# Patient Record
Sex: FEMALE | Race: WHITE
[De-identification: names, ages, dates, MRNs, and addresses within clinical notes are randomized per-mention and may not be internally consistent; named-entity substitution may affect disease eponyms.]

---

## 2017-04-12 ENCOUNTER — RECORD ABSTRACTING (OUTPATIENT)
Age: 72
End: 2017-04-12

## 2017-04-12 DIAGNOSIS — F17.200 NICOTINE DEPENDENCE, UNSPECIFIED, UNCOMPLICATED: ICD-10-CM

## 2017-04-12 DIAGNOSIS — Z82.49 FAMILY HISTORY OF ISCHEMIC HEART DISEASE AND OTHER DISEASES OF THE CIRCULATORY SYSTEM: ICD-10-CM

## 2017-04-12 DIAGNOSIS — Z82.0 FAMILY HISTORY OF EPILEPSY AND OTHER DISEASES OF THE NERVOUS SYSTEM: ICD-10-CM

## 2017-04-12 DIAGNOSIS — Z86.018 PERSONAL HISTORY OF OTHER BENIGN NEOPLASM: ICD-10-CM

## 2017-04-12 DIAGNOSIS — Z78.9 OTHER SPECIFIED HEALTH STATUS: ICD-10-CM

## 2017-04-12 PROBLEM — Z00.00 ENCOUNTER FOR PREVENTIVE HEALTH EXAMINATION: Status: ACTIVE | Noted: 2017-04-12

## 2018-03-10 ENCOUNTER — TRANSCRIPTION ENCOUNTER (OUTPATIENT)
Age: 73
End: 2018-03-10

## 2018-05-07 ENCOUNTER — OUTPATIENT (OUTPATIENT)
Dept: OUTPATIENT SERVICES | Facility: HOSPITAL | Age: 73
LOS: 1 days | Discharge: HOME | End: 2018-05-07

## 2018-05-07 DIAGNOSIS — M54.9 DORSALGIA, UNSPECIFIED: ICD-10-CM

## 2018-05-21 ENCOUNTER — OUTPATIENT (OUTPATIENT)
Dept: OUTPATIENT SERVICES | Facility: HOSPITAL | Age: 73
LOS: 1 days | Discharge: HOME | End: 2018-05-21

## 2018-05-29 DIAGNOSIS — Z13.820 ENCOUNTER FOR SCREENING FOR OSTEOPOROSIS: ICD-10-CM

## 2018-05-29 DIAGNOSIS — F17.200 NICOTINE DEPENDENCE, UNSPECIFIED, UNCOMPLICATED: ICD-10-CM

## 2018-05-29 DIAGNOSIS — M81.0 AGE-RELATED OSTEOPOROSIS WITHOUT CURRENT PATHOLOGICAL FRACTURE: ICD-10-CM

## 2018-05-29 DIAGNOSIS — Z78.0 ASYMPTOMATIC MENOPAUSAL STATE: ICD-10-CM

## 2018-06-25 ENCOUNTER — APPOINTMENT (OUTPATIENT)
Dept: SURGERY | Facility: CLINIC | Age: 73
End: 2018-06-25
Payer: MEDICARE

## 2018-06-25 VITALS
WEIGHT: 168 LBS | HEIGHT: 60 IN | SYSTOLIC BLOOD PRESSURE: 124 MMHG | DIASTOLIC BLOOD PRESSURE: 72 MMHG | BODY MASS INDEX: 32.98 KG/M2

## 2018-06-25 PROCEDURE — 99213 OFFICE O/P EST LOW 20 MIN: CPT

## 2018-06-25 RX ORDER — NIFEDIPINE 30 MG/1
30 TABLET, FILM COATED, EXTENDED RELEASE ORAL
Qty: 14 | Refills: 0 | Status: ACTIVE | COMMUNITY
Start: 2018-03-16

## 2018-06-27 ENCOUNTER — OUTPATIENT (OUTPATIENT)
Dept: OUTPATIENT SERVICES | Facility: HOSPITAL | Age: 73
LOS: 1 days | Discharge: HOME | End: 2018-06-27

## 2018-06-27 DIAGNOSIS — Z12.31 ENCOUNTER FOR SCREENING MAMMOGRAM FOR MALIGNANT NEOPLASM OF BREAST: ICD-10-CM

## 2019-07-02 ENCOUNTER — OUTPATIENT (OUTPATIENT)
Dept: OUTPATIENT SERVICES | Facility: HOSPITAL | Age: 74
LOS: 1 days | Discharge: HOME | End: 2019-07-02
Payer: MEDICARE

## 2019-07-02 DIAGNOSIS — Z12.31 ENCOUNTER FOR SCREENING MAMMOGRAM FOR MALIGNANT NEOPLASM OF BREAST: ICD-10-CM

## 2019-07-02 PROCEDURE — 77063 BREAST TOMOSYNTHESIS BI: CPT | Mod: 26

## 2019-07-02 PROCEDURE — 77067 SCR MAMMO BI INCL CAD: CPT | Mod: 26

## 2019-08-01 ENCOUNTER — OUTPATIENT (OUTPATIENT)
Dept: OUTPATIENT SERVICES | Facility: HOSPITAL | Age: 74
LOS: 1 days | Discharge: HOME | End: 2019-08-01
Payer: MEDICARE

## 2019-08-01 DIAGNOSIS — R42 DIZZINESS AND GIDDINESS: ICD-10-CM

## 2019-08-01 DIAGNOSIS — M54.5 LOW BACK PAIN: ICD-10-CM

## 2019-08-01 PROCEDURE — 72148 MRI LUMBAR SPINE W/O DYE: CPT | Mod: 26

## 2019-08-01 PROCEDURE — 70551 MRI BRAIN STEM W/O DYE: CPT | Mod: 26

## 2020-01-30 ENCOUNTER — TRANSCRIPTION ENCOUNTER (OUTPATIENT)
Age: 75
End: 2020-01-30

## 2020-09-22 ENCOUNTER — OUTPATIENT (OUTPATIENT)
Dept: OUTPATIENT SERVICES | Facility: HOSPITAL | Age: 75
LOS: 1 days | Discharge: HOME | End: 2020-09-22
Payer: MEDICARE

## 2020-09-22 DIAGNOSIS — Z12.31 ENCOUNTER FOR SCREENING MAMMOGRAM FOR MALIGNANT NEOPLASM OF BREAST: ICD-10-CM

## 2020-09-22 PROCEDURE — 77067 SCR MAMMO BI INCL CAD: CPT | Mod: 26

## 2020-09-22 PROCEDURE — 77063 BREAST TOMOSYNTHESIS BI: CPT | Mod: 26

## 2021-02-11 ENCOUNTER — OUTPATIENT (OUTPATIENT)
Dept: OUTPATIENT SERVICES | Facility: HOSPITAL | Age: 76
LOS: 1 days | Discharge: HOME | End: 2021-02-11

## 2021-02-12 DIAGNOSIS — Z13.820 ENCOUNTER FOR SCREENING FOR OSTEOPOROSIS: ICD-10-CM

## 2021-02-12 DIAGNOSIS — Z87.310 PERSONAL HISTORY OF (HEALED) OSTEOPOROSIS FRACTURE: ICD-10-CM

## 2021-02-12 DIAGNOSIS — M81.0 AGE-RELATED OSTEOPOROSIS WITHOUT CURRENT PATHOLOGICAL FRACTURE: ICD-10-CM

## 2021-02-12 DIAGNOSIS — Z78.0 ASYMPTOMATIC MENOPAUSAL STATE: ICD-10-CM

## 2021-02-12 DIAGNOSIS — F17.200 NICOTINE DEPENDENCE, UNSPECIFIED, UNCOMPLICATED: ICD-10-CM

## 2021-09-24 ENCOUNTER — OUTPATIENT (OUTPATIENT)
Dept: OUTPATIENT SERVICES | Facility: HOSPITAL | Age: 76
LOS: 1 days | Discharge: HOME | End: 2021-09-24
Payer: MEDICARE

## 2021-09-24 DIAGNOSIS — Z12.31 ENCOUNTER FOR SCREENING MAMMOGRAM FOR MALIGNANT NEOPLASM OF BREAST: ICD-10-CM

## 2021-09-24 PROCEDURE — 77063 BREAST TOMOSYNTHESIS BI: CPT | Mod: 26

## 2021-09-24 PROCEDURE — 77067 SCR MAMMO BI INCL CAD: CPT | Mod: 26

## 2021-11-16 ENCOUNTER — LABORATORY RESULT (OUTPATIENT)
Age: 76
End: 2021-11-16

## 2021-11-16 ENCOUNTER — APPOINTMENT (OUTPATIENT)
Dept: UROGYNECOLOGY | Facility: CLINIC | Age: 76
End: 2021-11-16
Payer: MEDICARE

## 2021-11-16 VITALS
BODY MASS INDEX: 33.38 KG/M2 | SYSTOLIC BLOOD PRESSURE: 148 MMHG | HEART RATE: 81 BPM | HEIGHT: 60 IN | WEIGHT: 170 LBS | DIASTOLIC BLOOD PRESSURE: 82 MMHG

## 2021-11-16 DIAGNOSIS — Z12.4 ENCOUNTER FOR SCREENING FOR MALIGNANT NEOPLASM OF CERVIX: ICD-10-CM

## 2021-11-16 DIAGNOSIS — Z78.0 ASYMPTOMATIC MENOPAUSAL STATE: ICD-10-CM

## 2021-11-16 DIAGNOSIS — K43.9 VENTRAL HERNIA W/OUT OBSTRUCTION OR GANGRENE: ICD-10-CM

## 2021-11-16 DIAGNOSIS — Z60.2 PROBLEMS RELATED TO LIVING ALONE: ICD-10-CM

## 2021-11-16 DIAGNOSIS — Z87.39 PERSONAL HISTORY OF OTHER DISEASES OF THE MUSCULOSKELETAL SYSTEM AND CONNECTIVE TISSUE: ICD-10-CM

## 2021-11-16 DIAGNOSIS — Z86.59 PERSONAL HISTORY OF OTHER MENTAL AND BEHAVIORAL DISORDERS: ICD-10-CM

## 2021-11-16 DIAGNOSIS — Z86.39 PERSONAL HISTORY OF OTHER ENDOCRINE, NUTRITIONAL AND METABOLIC DISEASE: ICD-10-CM

## 2021-11-16 DIAGNOSIS — Z83.42 FAMILY HISTORY OF FAMILIAL HYPERCHOLESTEROLEMIA: ICD-10-CM

## 2021-11-16 DIAGNOSIS — Z86.79 PERSONAL HISTORY OF OTHER DISEASES OF THE CIRCULATORY SYSTEM: ICD-10-CM

## 2021-11-16 PROCEDURE — 51701 INSERT BLADDER CATHETER: CPT

## 2021-11-16 PROCEDURE — 99205 OFFICE O/P NEW HI 60 MIN: CPT | Mod: 25

## 2021-11-16 RX ORDER — ESTRADIOL 0.1 MG/G
0.1 CREAM VAGINAL
Qty: 1 | Refills: 6 | Status: ACTIVE | COMMUNITY
Start: 2021-11-16 | End: 1900-01-01

## 2021-11-16 RX ORDER — SERTRALINE HYDROCHLORIDE 100 MG/1
100 TABLET, FILM COATED ORAL
Refills: 0 | Status: ACTIVE | COMMUNITY

## 2021-11-16 RX ORDER — SERTRALINE HYDROCHLORIDE 25 MG/1
25 TABLET, FILM COATED ORAL
Refills: 0 | Status: ACTIVE | COMMUNITY

## 2021-11-16 RX ORDER — ASPIRIN 81 MG
81 TABLET,CHEWABLE ORAL
Refills: 0 | Status: ACTIVE | COMMUNITY

## 2021-11-16 RX ORDER — BUPROPION HYDROCHLORIDE 75 MG/1
TABLET, FILM COATED ORAL
Refills: 0 | Status: ACTIVE | COMMUNITY

## 2021-11-16 RX ORDER — ROSUVASTATIN CALCIUM 40 MG/1
40 TABLET, FILM COATED ORAL
Refills: 0 | Status: ACTIVE | COMMUNITY

## 2021-11-16 RX ORDER — HYDROCHLOROTHIAZIDE 25 MG/1
25 TABLET ORAL
Refills: 0 | Status: ACTIVE | COMMUNITY

## 2021-11-16 RX ORDER — POLYETHYLENE GLYCOL 3350 17 G/17G
17 POWDER, FOR SOLUTION ORAL
Qty: 5 | Refills: 0 | Status: DISCONTINUED | COMMUNITY
Start: 2018-03-10 | End: 2021-11-16

## 2021-11-16 RX ORDER — CYCLOBENZAPRINE HYDROCHLORIDE 5 MG/1
5 TABLET, FILM COATED ORAL
Qty: 14 | Refills: 0 | Status: COMPLETED | COMMUNITY
Start: 2018-03-10 | End: 2021-11-16

## 2021-11-16 RX ORDER — NAPROXEN 500 MG/1
500 TABLET ORAL
Qty: 20 | Refills: 0 | Status: DISCONTINUED | COMMUNITY
Start: 2018-03-19 | End: 2021-11-16

## 2021-11-16 RX ORDER — METHOCARBAMOL 750 MG/1
750 TABLET, FILM COATED ORAL
Qty: 10 | Refills: 0 | Status: DISCONTINUED | COMMUNITY
Start: 2018-04-17 | End: 2021-11-16

## 2021-11-16 SDOH — SOCIAL STABILITY - SOCIAL INSECURITY: PROBLEMS RELATED TO LIVING ALONE: Z60.2

## 2021-11-17 LAB
APPEARANCE: CLEAR
BILIRUBIN URINE: NEGATIVE
BLOOD URINE: ABNORMAL
COLOR: YELLOW
GLUCOSE QUALITATIVE U: NEGATIVE
KETONES URINE: NEGATIVE
LEUKOCYTE ESTERASE URINE: NEGATIVE
NITRITE URINE: NEGATIVE
PH URINE: 6
PROTEIN URINE: NORMAL
SPECIFIC GRAVITY URINE: 1.02
UROBILINOGEN URINE: NORMAL

## 2021-11-19 LAB — URINE CULTURE <10: NORMAL

## 2021-12-03 ENCOUNTER — LABORATORY RESULT (OUTPATIENT)
Age: 76
End: 2021-12-03

## 2021-12-03 NOTE — COUNSELING
[FreeTextEntry1] : Please return for follow up in 6-8 weeks.\par \par Place a pea size (0.5 grams or less) dab of Estrogen vaginal cream using finger (NOT the applicator) into the vagina 3 times a week ( Monday, Wednesday, Friday)\par \par For Urgency, Frequency and Urge related incontinence.\par \par Please decrease or stop the use of the following:\par \par 1. Coffee (Caffeinated and decaffeinated)\par \par 2. Teas (Caffeinated, decaffeinated, Ice tea, and green teas\par \par 3. All sodas (Caffeinated, decaffeinated, energy drinks)\par \par 4. All carbonated drinks including seltzer water\par \par 5. All citric fruit juices\par \par 6. Water with lemon or lime\par \par 7. Spicy foods\par \par 8. Tomato Sauce based foods\par \par 9. Chocolate and chocolate containing products\par \par 10. All alcohol\par \par To reduce urinary frequency at night, please restrict all fluid intake 2-3 hours prior to bedtime.\par \par For better bowel emptying please use Benefiber and start with 2 tablespoons daily.\par \par \par

## 2021-12-03 NOTE — ASU PATIENT PROFILE, ADULT - NSICDXPASTMEDICALHX_GEN_ALL_CORE_FT
PAST MEDICAL HISTORY:  Depression     High cholesterol     HTN (hypertension)     OP (osteoporosis)

## 2021-12-03 NOTE — ASU PATIENT PROFILE, ADULT - FALL HARM RISK - UNIVERSAL INTERVENTIONS
Bed in lowest position, wheels locked, appropriate side rails in place/Call bell, personal items and telephone in reach/Instruct patient to call for assistance before getting out of bed or chair/Non-slip footwear when patient is out of bed/[place] to call system/Physically safe environment - no spills, clutter or unnecessary equipment/Purposeful Proactive Rounding/Room/bathroom lighting operational, light cord in reach

## 2021-12-03 NOTE — ASSESSMENT
[FreeTextEntry1] : Overactive bladder syndrome -\par Discussed etiology of the condition with the patient. Discussed management options, including first line management options consisting of diet and lifestyle modifications, second line options consisting of medications, and third line options. Patient will start with bladder diet and bladder training. We spoke about bladder irritants at length, and she was given a list of bladder irritants to avoid. I also encouraged her quit smoking as that is very much related to her symptoms. She will return in 6-8 weeks for reevaluation.\par \par Vaginal atrophy -\par Discussed etiology and treatment options with patient. Discussed R/B/A of estrogen vaginal cream. Patient will start using as follows:\par Place a pea size (0.5 grams or less) dab of Estrogen vaginal cream using finger (NOT the applicator) into the vagina 3 times a week ( Monday, Wednesday, Friday)\par \par Nocturia -\par To reduce urinary frequency at night, please restrict all fluid intake 2-3 hours prior to bedtime.\par \par Fecal urgency -\par Discussed possible etiologies with patient. She will start using benefiber 2 tablespoons daily to regulate her bowel movements.\par

## 2021-12-03 NOTE — HISTORY OF PRESENT ILLNESS
[FreeTextEntry1] : 76 year para 4 ( x4, 3 live children) presents with complaints of urinary incontinence. She is experiencing frequency and inability to hold her urine. She's had these symptoms for at lest 6 months.\par \par Pelvic organ prolapse: no bulge, no pressure/heaviness\par \par Stress urinary incontinence: 1 x/week       no prior incontinence procedures\par \par Overactive bladder syndrome: daily frequency 7-8 x/day, 3 x/night,  + urgency,  14-21 x/week UUI episodes,    1-2 pads/day     Bladder irritants include coffee (2-3 cups), lemon water,    Prior OAB meds no\par \par Voiding dysfunction: occasional Incomplete bladder emptying, no hesitancy\par \par Lower urinary tract/vaginal symptoms: no UTIs per year\par \par 14 BM/week   no constipation   Fecal incontinence no     + fecal urgency\par \par Sexually active no     Pelvic pain no    Vaginal dryness no     LMP age 48    PMB no\par \par Current day smoker for past 50 years, currently trying to cut down.

## 2021-12-03 NOTE — PHYSICAL EXAM
[Chaperone Present] : A chaperone was present in the examining room during all aspects of the physical examination [FreeTextEntry1] : Void: 200 cc\par \par PVR: 10 cc\par \par Urethra was prepped in sterile fashion and then a sterile catheter was used by me to drain the bladder.\par \par neg empty cough stress test\par \par + atrophy\par \par no urethral caruncle\par \par no vestibular tenderness\par \par no prolapse\par \par no urethral hypermobility\par \par no pelvic floor dysfunction\par \par no urethral tenderness\par \par no bladder tenderness\par \par normal appearing cervix\par \par normal sized uterus, nontender\par \par good sphincter tone\par \par good rectal squeeze\par \par intact sacral nerves\par \par 1/5 Kegel\par

## 2021-12-06 ENCOUNTER — OUTPATIENT (OUTPATIENT)
Dept: OUTPATIENT SERVICES | Facility: HOSPITAL | Age: 76
LOS: 1 days | Discharge: HOME | End: 2021-12-06

## 2021-12-06 VITALS
OXYGEN SATURATION: 97 % | WEIGHT: 169.98 LBS | SYSTOLIC BLOOD PRESSURE: 131 MMHG | HEIGHT: 68 IN | RESPIRATION RATE: 18 BRPM | TEMPERATURE: 98 F | DIASTOLIC BLOOD PRESSURE: 89 MMHG | HEART RATE: 84 BPM

## 2021-12-06 VITALS
HEART RATE: 72 BPM | TEMPERATURE: 98 F | SYSTOLIC BLOOD PRESSURE: 157 MMHG | RESPIRATION RATE: 18 BRPM | DIASTOLIC BLOOD PRESSURE: 77 MMHG | OXYGEN SATURATION: 96 %

## 2021-12-06 DIAGNOSIS — Z90.49 ACQUIRED ABSENCE OF OTHER SPECIFIED PARTS OF DIGESTIVE TRACT: Chronic | ICD-10-CM

## 2021-12-06 RX ORDER — BUPROPION HYDROCHLORIDE 150 MG/1
0 TABLET, EXTENDED RELEASE ORAL
Qty: 0 | Refills: 0 | DISCHARGE

## 2021-12-06 RX ORDER — HYDROCHLOROTHIAZIDE 25 MG
0 TABLET ORAL
Qty: 0 | Refills: 0 | DISCHARGE

## 2021-12-06 RX ORDER — SIMVASTATIN 20 MG/1
0 TABLET, FILM COATED ORAL
Qty: 0 | Refills: 0 | DISCHARGE

## 2021-12-06 NOTE — ASU DISCHARGE PLAN (ADULT/PEDIATRIC) - CARE PROVIDER_API CALL
Ivonne Naik (MD)  Surgery of the Hand  [facility] [place] [address]  Phone: [phone]  Fax: [phone]  Follow Up Time:

## 2021-12-06 NOTE — ASU DISCHARGE PLAN (ADULT/PEDIATRIC) - NS MD DC FALL RISK RISK
For information on Fall & Injury Prevention, visit: [email or web address] OR  [email or web address] OR  https://www.cdc.gov/steadi/patient.html

## 2021-12-09 DIAGNOSIS — M81.0 AGE-RELATED OSTEOPOROSIS WITHOUT CURRENT PATHOLOGICAL FRACTURE: ICD-10-CM

## 2021-12-09 DIAGNOSIS — I10 ESSENTIAL (PRIMARY) HYPERTENSION: ICD-10-CM

## 2021-12-09 DIAGNOSIS — M65.341 TRIGGER FINGER, RIGHT RING FINGER: ICD-10-CM

## 2021-12-09 DIAGNOSIS — E78.00 PURE HYPERCHOLESTEROLEMIA, UNSPECIFIED: ICD-10-CM

## 2021-12-09 DIAGNOSIS — Z88.0 ALLERGY STATUS TO PENICILLIN: ICD-10-CM

## 2021-12-09 DIAGNOSIS — Z90.49 ACQUIRED ABSENCE OF OTHER SPECIFIED PARTS OF DIGESTIVE TRACT: ICD-10-CM

## 2021-12-09 DIAGNOSIS — F32.A DEPRESSION, UNSPECIFIED: ICD-10-CM

## 2022-01-31 ENCOUNTER — EMERGENCY (EMERGENCY)
Facility: HOSPITAL | Age: 77
LOS: 0 days | Discharge: HOME | End: 2022-01-31
Attending: EMERGENCY MEDICINE | Admitting: EMERGENCY MEDICINE
Payer: MEDICARE

## 2022-01-31 ENCOUNTER — TRANSCRIPTION ENCOUNTER (OUTPATIENT)
Age: 77
End: 2022-01-31

## 2022-01-31 VITALS
RESPIRATION RATE: 18 BRPM | DIASTOLIC BLOOD PRESSURE: 72 MMHG | HEIGHT: 68 IN | TEMPERATURE: 98 F | OXYGEN SATURATION: 97 % | HEART RATE: 84 BPM | SYSTOLIC BLOOD PRESSURE: 150 MMHG

## 2022-01-31 DIAGNOSIS — Z79.82 LONG TERM (CURRENT) USE OF ASPIRIN: ICD-10-CM

## 2022-01-31 DIAGNOSIS — F32.A DEPRESSION, UNSPECIFIED: ICD-10-CM

## 2022-01-31 DIAGNOSIS — R22.1 LOCALIZED SWELLING, MASS AND LUMP, NECK: ICD-10-CM

## 2022-01-31 DIAGNOSIS — E78.00 PURE HYPERCHOLESTEROLEMIA, UNSPECIFIED: ICD-10-CM

## 2022-01-31 DIAGNOSIS — E78.5 HYPERLIPIDEMIA, UNSPECIFIED: ICD-10-CM

## 2022-01-31 DIAGNOSIS — Z88.0 ALLERGY STATUS TO PENICILLIN: ICD-10-CM

## 2022-01-31 DIAGNOSIS — K11.20 SIALOADENITIS, UNSPECIFIED: ICD-10-CM

## 2022-01-31 DIAGNOSIS — Z90.49 ACQUIRED ABSENCE OF OTHER SPECIFIED PARTS OF DIGESTIVE TRACT: Chronic | ICD-10-CM

## 2022-01-31 DIAGNOSIS — I10 ESSENTIAL (PRIMARY) HYPERTENSION: ICD-10-CM

## 2022-01-31 PROBLEM — M81.0 AGE-RELATED OSTEOPOROSIS WITHOUT CURRENT PATHOLOGICAL FRACTURE: Chronic | Status: ACTIVE | Noted: 2021-12-06

## 2022-01-31 LAB
ALBUMIN SERPL ELPH-MCNC: 4.1 G/DL — SIGNIFICANT CHANGE UP (ref 3.5–5.2)
ALP SERPL-CCNC: 71 U/L — SIGNIFICANT CHANGE UP (ref 30–115)
ALT FLD-CCNC: 19 U/L — SIGNIFICANT CHANGE UP (ref 0–41)
ANION GAP SERPL CALC-SCNC: 17 MMOL/L — HIGH (ref 7–14)
AST SERPL-CCNC: 24 U/L — SIGNIFICANT CHANGE UP (ref 0–41)
BASOPHILS # BLD AUTO: 0.05 K/UL — SIGNIFICANT CHANGE UP (ref 0–0.2)
BASOPHILS NFR BLD AUTO: 0.7 % — SIGNIFICANT CHANGE UP (ref 0–1)
BILIRUB SERPL-MCNC: 0.3 MG/DL — SIGNIFICANT CHANGE UP (ref 0.2–1.2)
BUN SERPL-MCNC: 16 MG/DL — SIGNIFICANT CHANGE UP (ref 10–20)
CALCIUM SERPL-MCNC: 10.1 MG/DL — SIGNIFICANT CHANGE UP (ref 8.5–10.1)
CHLORIDE SERPL-SCNC: 100 MMOL/L — SIGNIFICANT CHANGE UP (ref 98–110)
CO2 SERPL-SCNC: 22 MMOL/L — SIGNIFICANT CHANGE UP (ref 17–32)
CREAT SERPL-MCNC: 0.8 MG/DL — SIGNIFICANT CHANGE UP (ref 0.7–1.5)
EOSINOPHIL # BLD AUTO: 0.14 K/UL — SIGNIFICANT CHANGE UP (ref 0–0.7)
EOSINOPHIL NFR BLD AUTO: 2.1 % — SIGNIFICANT CHANGE UP (ref 0–8)
GLUCOSE SERPL-MCNC: 126 MG/DL — HIGH (ref 70–99)
HCT VFR BLD CALC: 39.6 % — SIGNIFICANT CHANGE UP (ref 37–47)
HGB BLD-MCNC: 12.9 G/DL — SIGNIFICANT CHANGE UP (ref 12–16)
IMM GRANULOCYTES NFR BLD AUTO: 0.3 % — SIGNIFICANT CHANGE UP (ref 0.1–0.3)
LYMPHOCYTES # BLD AUTO: 1.36 K/UL — SIGNIFICANT CHANGE UP (ref 1.2–3.4)
LYMPHOCYTES # BLD AUTO: 20.3 % — LOW (ref 20.5–51.1)
MCHC RBC-ENTMCNC: 26.8 PG — LOW (ref 27–31)
MCHC RBC-ENTMCNC: 32.6 G/DL — SIGNIFICANT CHANGE UP (ref 32–37)
MCV RBC AUTO: 82.3 FL — SIGNIFICANT CHANGE UP (ref 81–99)
MONOCYTES # BLD AUTO: 0.5 K/UL — SIGNIFICANT CHANGE UP (ref 0.1–0.6)
MONOCYTES NFR BLD AUTO: 7.5 % — SIGNIFICANT CHANGE UP (ref 1.7–9.3)
NEUTROPHILS # BLD AUTO: 4.62 K/UL — SIGNIFICANT CHANGE UP (ref 1.4–6.5)
NEUTROPHILS NFR BLD AUTO: 69.1 % — SIGNIFICANT CHANGE UP (ref 42.2–75.2)
NRBC # BLD: 0 /100 WBCS — SIGNIFICANT CHANGE UP (ref 0–0)
PLATELET # BLD AUTO: 207 K/UL — SIGNIFICANT CHANGE UP (ref 130–400)
POTASSIUM SERPL-MCNC: 3.9 MMOL/L — SIGNIFICANT CHANGE UP (ref 3.5–5)
POTASSIUM SERPL-SCNC: 3.9 MMOL/L — SIGNIFICANT CHANGE UP (ref 3.5–5)
PROT SERPL-MCNC: 6.5 G/DL — SIGNIFICANT CHANGE UP (ref 6–8)
RBC # BLD: 4.81 M/UL — SIGNIFICANT CHANGE UP (ref 4.2–5.4)
RBC # FLD: 14.3 % — SIGNIFICANT CHANGE UP (ref 11.5–14.5)
SODIUM SERPL-SCNC: 139 MMOL/L — SIGNIFICANT CHANGE UP (ref 135–146)
WBC # BLD: 6.69 K/UL — SIGNIFICANT CHANGE UP (ref 4.8–10.8)
WBC # FLD AUTO: 6.69 K/UL — SIGNIFICANT CHANGE UP (ref 4.8–10.8)

## 2022-01-31 PROCEDURE — 99285 EMERGENCY DEPT VISIT HI MDM: CPT

## 2022-01-31 PROCEDURE — 70491 CT SOFT TISSUE NECK W/DYE: CPT | Mod: 26,MA

## 2022-01-31 NOTE — ED PROVIDER NOTE - PHYSICAL EXAMINATION
CONSTITUTIONAL: Well-appearing; well-nourished; in no apparent distress.   HEAD: Normocephalic; atraumatic.   EYES: PERRL; EOM intact. Conjunctiva normal B/L.   ENT: Normal pharynx with no tonsillar hypertrophy. MMM. L neck mass w/ mild ttp, no supraclavicular masses, no trismus, no swelling at the base of the tongue, no uvular deviation, no axillary masses, no trauma to the ear, b/l carotid pulses 2+,   NECK: Supple; non-tender; no cervical lymphadenopathy.   CHEST: Normal chest excursion with respiration.   CARDIOVASCULAR: Normal S1, S2; no murmurs, rubs, or gallops.   RESPIRATORY: Normal chest excursion with respiration; breath sounds clear and equal bilaterally; no wheezes, rhonchi, or rales.  GI/: Normal bowel sounds; non-distended; non-tender.  BACK: No evidence of trauma or deformity. Non-tender to palpation. No CVA tenderness.   EXT: Normal ROM in all four extremities; non-tender to palpation; distal pulses are normal. No leg edema B/L.   SKIN: Normal for age and race; warm; dry; good turgor.  NEURO: A & O x 4; CN 2-12 intact. Grossly unremarkable.

## 2022-01-31 NOTE — ED PROVIDER NOTE - NS ED ROS FT
Constitutional:  See HPI.   Eyes:  No visual changes, eye pain or discharge.  ENMT:  No hearing changes, pain, discharge or infections. No neck pain or stiffness. +L neck mass  Cardiac:  No chest pain, SOB or edema. No chest pain with exertion.  Respiratory:  No cough or respiratory distress. No hemoptysis.  GI:  No nausea, vomiting, diarrhea, abdominal pain.  :  No dysuria, frequency, hematuria  MS:  No joint pain or back pain.  Neuro:  No LOC. No headache or weakness.    Skin:  No skin rash.  Except as in HPI, all other review of systems is negative

## 2022-01-31 NOTE — ED PROVIDER NOTE - CLINICAL SUMMARY MEDICAL DECISION MAKING FREE TEXT BOX
Patient with acute onset of left neck swelling, testing included labs and CT scan and showed enlargement of the left submandibular gland without a discrete abscess.  Exam is reassuring, labs are within normal limits, the patient was prescribed antibiotics based on CT findings and advised to follow-up with ENT this week, strict return precautions given.  Stable for DC home at present.

## 2022-01-31 NOTE — ED PROVIDER NOTE - OBJECTIVE STATEMENT
76y F PMHx incontinence, HTN, HLD c/o L neck mass x1d. pt states that she noticed a L painless neck mass, went to  and was told to come to ED for further assessment. Pt's daughter states that she had an episode of "hypothyroidism" a year ago w/o further work up. pt denies other symptoms, no pain, no diaphoresis, weight change, vision changes, cp,sob, eye pain, urinary changes, no difficulty phonating, and tolerating PO

## 2022-01-31 NOTE — ED PROVIDER NOTE - PATIENT PORTAL LINK FT
You can access the FollowMyHealth Patient Portal offered by [facility] by registering at the following website: [email or web address]. By joining [facility]’s FollowMyHealth portal, you will also be able to view your health information using other applications (apps) compatible with our system.

## 2022-01-31 NOTE — ED PROVIDER NOTE - ATTENDING CONTRIBUTION TO CARE
76-year-old female with history of hyperlipidemia, hypertension, on aspirin 81, report of some thyroid or parathyroid problem which was never followed up, presents with her daughter with report of left neck swelling noticed since yesterday, appears to be increasing. Denies pain, fever, cough, weight loss, trouble swallowing or breathing, vision changes, fall/trauma, ST, and all other symptoms. On exam, afebrile, hemodynamically stable, saturating well, NAD, well appearing, sitting comfortably in bed, no WOB/stridor/phonation changes/salivary pooling, speaking full sentences, head NCAT, EOMI grossly, anicteric, MMM, no JVD, noted skin-color L neck mass, nonexpansile/nonpulsatile/no bruit, NT, RRR, nml S1/S2, no m/r/g, lungs CTAB, no w/r/r, abd soft, NT, ND, nml BS, no rebound or guarding, AAO, CN's 3-12 grossly intact, RUANO spontaneously, no leg cyanosis or edema, skin warm, well perfused, no rashes or hives. Low suspicion for arterial malformation. Low suspicion for. No B symptoms to suggest lymphoma. No tachycardia or other signs or symptoms to suggest thyroiditis. No evidence of airway compromise. Will obtain labs, CT to further clarify. Signed off care to Dr. JUAN DAVID Hall who will follow up labs and CT and reassess.

## 2022-01-31 NOTE — ED ADULT TRIAGE NOTE - CHIEF COMPLAINT QUOTE
pt states, "I noticed my neck started swelling a little." Swelling noted to left neck area.  Denies any SOB, no distress. Able to speak in full sentences. Throat visualized.

## 2022-01-31 NOTE — ED PROVIDER NOTE - CARE PLAN
Principal Discharge DX:	Swelling, mass, or lump in head and neck  Secondary Diagnosis:	Parotitis   1

## 2022-01-31 NOTE — ED PROVIDER NOTE - PROGRESS NOTE DETAILS
Signed off care to Dr. JUAN DAVID Hall who will follow up labs and CT and reassess. HPI: Patient notes to me by Dr. Arvizu to follow-up CT scan and dispo.  His CT is pending will reassess once the test is completed. TN - pt stable in stretcher, eating w/o complication, no difficulty phonating TN - sialogogue given; pt tolerated w/ saliva expression; massaged parotid gland w/ mild improvement. pt instructed to see ENT outpt f/u and to finish course of abx   Strict ED return precautions given. Pt verbalized understanding and was agreeable with plan. EP: The patient appears very well there is a mildly tender mass in the left submandibular area, normal floor of the mouth, normal phonation without drooling or trismus, there is no skin erythema, neck is supple.  Imaging CT scan was done and shows large amount of the left submandibular gland with surrounding edema and stranding without any discrete abscess.  Results of all the tests were discussed with the patient and her daughter who is at the bedside, antibiotics was prescribed, patient was advised to follow-up with ENT this week, contact information given.  Strict return precautions discussed with the patient and her daughter, they both verbalized understanding and are amenable with the plan.

## 2022-01-31 NOTE — ED PROVIDER NOTE - CARE PROVIDER_API CALL
Davidson Toribio)  Otolaryngology  [address] [place] [address], [address] [place] [address]  Phone: [phone]  Fax: [phone]  Follow Up Time: 4-6 Days

## 2022-01-31 NOTE — ED PROVIDER NOTE - NSFOLLOWUPINSTRUCTIONS_ED_ALL_ED_FT
Parotitis    Parotitis is inflammation of one or both of your parotid glands. These glands produce saliva. They are found on each side of your face, below and in front of your earlobes. The saliva that they produce comes out of tiny openings (ducts) inside your cheeks.    Parotitis may cause sudden swelling and pain (acute parotitis). It can also cause repeated episodes of swelling and pain or continued swelling that may or may not be painful (chronic parotitis).    What are the causes?    This condition may be caused by:  •Infections from bacteria.    •Infections from viruses, such as mumps or HIV.    •Blockage (obstruction) of saliva flow through the parotid glands. This can be from a stone, scar tissue, or a tumor.    •Diseases that cause your body's defense system (immune system) to attack healthy cells in your salivary glands. These are called autoimmune diseases.    What increases the risk?    You are more likely to develop this condition if:  •You are 50 years old or older.    •You do not drink enough fluids (are dehydrated).    •You drink too much alcohol.    •You have:  •A dry mouth.    •Poor dental hygiene.    •Diabetes.    •Gout.    •A long-term illness.    •You have had radiation treatments to the head and neck.    •You take certain medicines.    What are the signs or symptoms?    Symptoms of this condition depend on the cause. Symptoms may include:  •Swelling under and in front of the ear. This may get worse after eating.    •Redness of the skin over the parotid gland.    •Pain and tenderness over the parotid gland. This may get worse after eating.    •Fever or chills.    •Pus coming from the ducts inside the mouth.    •Dry mouth.    •A bad taste in the mouth.    How is this diagnosed?    This condition may be diagnosed based on:  •Your medical history.    •A physical exam.    •Tests to find the cause of the parotitis. These may include:  •Doing blood tests to check for an autoimmune disease or infections from a virus.    •Taking a fluid sample from the parotid gland and testing it for infection.    •Injecting the ducts of the parotid gland with a dye and then taking X-rays (sialogram).    •Having other imaging tests of the gland, such as X-rays, ultrasound, MRI, or CT scan.    •Checking the opening of the gland for a stone or obstruction.    •Placing a needle into the gland to remove tissue for a biopsy (fine needle aspiration).    How is this treated?    Treatment for this condition depends on the cause. Treatment may include:  •Antibiotic medicine for a bacterial infection.    •Drinking more fluids.    •Removing a stone or obstruction.    •Treating an underlying disease that is causing parotitis.    •Surgery to drain an infection, remove a growth, or remove the whole gland (parotidectomy).    Treatment may not be needed if parotid swelling goes away with home care.    Follow these instructions at home:    Medicines      •Take over-the-counter and prescription medicines only as told by your health care provider.    •If you were prescribed an antibiotic medicine, take it as told by your health care provider. Do not stop taking the antibiotic even if you start to feel better.    Managing pain and swelling   •If directed, apply heat to the affected area as often as told by your health care provider. Use the heat source that your health care provider recommends, such as a moist heat pack or a heating pad. To apply the heat:  •Place a towel between your skin and the heat source.    •Leave the heat on for 20–30 minutes.    •Remove the heat if your skin turns bright red. This is especially important if you are unable to feel pain, heat, or cold. You may have a greater risk of getting burned.    •Gargle with a salt-water mixture 3–4 times a day or as needed. To make a salt-water mixture, completely dissolve ½–1 tsp (3–6 g) of salt in 1 cup (237 mL) of warm water.    •Gently massage the parotid glands as told by your health care provider.    General instructions      •Drink enough fluid to keep your urine pale yellow.    •Keep your mouth clean and moist.    •Try sucking on sour candy. This may help to make your mouth less dry by stimulating the flow of saliva.    •Maintain good oral health.  •Brush your teeth at least two times a day.    •Floss your teeth every day.    •See your dentist regularly.    • Do not use any products that contain nicotine or tobacco, such as cigarettes, e-cigarettes, and chewing tobacco. If you need help quitting, ask your health care provider.    • Do not drink alcohol.    •Keep all follow-up visits as told by your health care provider. This is important.    Contact a health care provider if:    •You have a fever or chills.    •You have new symptoms.    •Your symptoms get worse.    •Your symptoms do not improve with treatment.    Get help right away if:    •You have difficulty breathing or swallowing because of the swollen gland.    Summary    •Parotitis is inflammation of one or both of your parotid glands.    •Symptoms include pain and swelling under and in front of the ear. They may also include a fever and a bad taste in your mouth.    •This condition may be treated with antibiotics, increasing fluids, or surgery.    •In some cases, parotitis may go away on its own without treatment.    •You should drink plenty of fluids, maintain good oral hygiene, and avoid tobacco products.    This information is not intended to replace advice given to you by your health care provider. Make sure you discuss any questions you have with your health care provider.

## 2022-02-01 NOTE — ED POST DISCHARGE NOTE - REASON FOR FOLLOW-UP
Other Pt called stating Clindamycin liquid Rx cost $200 at pharmacy. Called pharmacy and spoke with staff who confirm price secondary to quantity of liquid (states patient would require six bottles). Rx changed to Clindamycin capsules - patient notified and will .

## 2022-02-14 ENCOUNTER — APPOINTMENT (OUTPATIENT)
Dept: OTOLARYNGOLOGY | Facility: CLINIC | Age: 77
End: 2022-02-14
Payer: MEDICARE

## 2022-02-14 DIAGNOSIS — R22.1 LOCALIZED SWELLING, MASS AND LUMP, NECK: ICD-10-CM

## 2022-02-14 DIAGNOSIS — K11.20 SIALOADENITIS, UNSPECIFIED: ICD-10-CM

## 2022-02-14 PROCEDURE — 99204 OFFICE O/P NEW MOD 45 MIN: CPT

## 2022-02-14 NOTE — ASSESSMENT
[FreeTextEntry1] : Episode of left submandibular sialoadenitis related to dehydration\par Since resolved with antibiotics and sialogogues\par Discussed hydration, massage, and sialogogues if it recurs\par Imaging reviewed, no sign of sialolithiasis or compressive mass\par RV prn

## 2022-02-14 NOTE — DATA REVIEWED
[de-identified] : CT neck 1/31/2022 reviewed. Mild enlargement and enhancement left SMG gland, no mass or sialoliathiasis

## 2022-02-14 NOTE — REASON FOR VISIT
[Initial Evaluation] : an initial evaluation for [FreeTextEntry2] : submandibular swelling left side

## 2022-02-14 NOTE — HISTORY OF PRESENT ILLNESS
[de-identified] : patient presents today c/o left neck mass.  Patient noticed the lump 2 Weeks ago and was seen in urgent CARE and was  told to go to Emergency room . Ct scan performed.  She was given antibiotic and swelling has decreased significantly. Denies dysphagia or hoarseness. No previous issues, no recent dental work. She is currently asymptomatic. She admits poor hydration. She has been taking sour candies which seemed to resolve her initial problem.

## 2022-02-14 NOTE — PHYSICAL EXAM
[Nasal Endoscopy Performed] : nasal endoscopy was performed, see procedure section for findings [Midline] : trachea located in midline position [Normal] : no rashes [de-identified] : submandibular swelling left neck non tender [de-identified] : Left SMG mildly firm, no masses or lesions, floor of mouth without stone

## 2022-02-15 ENCOUNTER — APPOINTMENT (OUTPATIENT)
Dept: UROGYNECOLOGY | Facility: CLINIC | Age: 77
End: 2022-02-15
Payer: MEDICARE

## 2022-02-15 VITALS
HEART RATE: 81 BPM | BODY MASS INDEX: 33.77 KG/M2 | SYSTOLIC BLOOD PRESSURE: 154 MMHG | WEIGHT: 172 LBS | HEIGHT: 60 IN | DIASTOLIC BLOOD PRESSURE: 87 MMHG

## 2022-02-15 PROCEDURE — 99215 OFFICE O/P EST HI 40 MIN: CPT

## 2022-02-15 NOTE — ASSESSMENT
[FreeTextEntry1] : Overactive bladder syndrome -\par Patient has not seen much improvement in her symptoms with bladder diet and vaginal estrogen cream. Today we discussed treatment with second line options for her. Specifically we discussed R/B/A of anticholinergics. Patient is not a good candidate for Myrbetriq. She would like to try oxybutynin ER 5mg. Rx was written. We will call her in about 6 weeks to follow up on her symptoms and will increase the dose or switch her medication depending on how she feels. If she feels satisfied with her symptoms in 6 weeks, then we will have her follow up with YEHUDA Mancia in 6 months.\par We once again discussed importance of quitting smoking and its effects on her bladder.\par \par Vaginal atrophy -\par She was instructed to use vaginal estrogen cream every other day.

## 2022-02-15 NOTE — HISTORY OF PRESENT ILLNESS
[FreeTextEntry1] : 76 year old para 3 with vaginal atrophy, urinary urgency, UUI, fecal urgency, urinary frequency and nocturia. She has been applying vaginal estrogen cream nightly. She reports her urinary symptoms have not really changed.\par  \par Voids 6-7/ day, 3/night. + urgency, 1 SALVATORE/week, 14-21 UUI/week, no UTI's in past 6 months,    not following bladder diet.\par 14 BM/week. no FI, no fiber, no Miralax \par Uses vaginal estrogen 7x/week. no vaginal bleeding, no vaginal/pelvic pain.  no sexually active.\par  \par Med/Surg Hx\par Current day smoker for past 50 years, currently trying to cut down.\par \par \par From initial visit:\par 76 year para 4 ( x4, 3 live children) presents with complaints of urinary incontinence. She is experiencing frequency and inability to hold her urine. She's had these symptoms for at lest 6 months.\par \par Pelvic organ prolapse: no bulge, no pressure/heaviness\par \par Stress urinary incontinence: 1 x/week       no prior incontinence procedures\par \par Overactive bladder syndrome: daily frequency 7-8 x/day, 3 x/night,  + urgency,  14-21 x/week UUI episodes,    1-2 pads/day     Bladder irritants include coffee (2-3 cups), lemon water,    Prior OAB meds no\par \par Voiding dysfunction: occasional Incomplete bladder emptying, no hesitancy\par \par Lower urinary tract/vaginal symptoms: no UTIs per year\par \par 14 BM/week   no constipation   Fecal incontinence no     + fecal urgency\par \par Sexually active no     Pelvic pain no    Vaginal dryness no     LMP age 48    PMB no\par \par Current day smoker for past 50 years, currently trying to cut down.

## 2022-02-15 NOTE — PHYSICAL EXAM
[Chaperone Present] : A chaperone was present in the examining room during all aspects of the physical examination [FreeTextEntry1] : Urethra was prepped in sterile fashion and then a sterile catheter was used by me to drain the bladder.\par \par neg empty cough stress test\par \par improved atrophy\par \par no urethral caruncle\par \par no vestibular tenderness\par \par no prolapse\par \par no urethral hypermobility\par \par no pelvic floor dysfunction\par \par no urethral tenderness\par \par no bladder tenderness\par \par normal appearing cervix\par \par normal sized uterus, nontender\par \par good sphincter tone\par \par good rectal squeeze\par \par intact sacral nerves\par \par 1/5 Kegel

## 2022-02-15 NOTE — COUNSELING
[FreeTextEntry1] : We will follow up with you in 6 weeks.\par \par Please start taking oxybutynin 5mg ER daily and call us if you are experiencing any bothersome side effects.\par \par Place a pea size (0.5 grams or less) dab of Estrogen vaginal cream using finger (NOT the applicator) into the vagina 3 times a week ( Monday, Wednesday, Friday)\par \par \par For Urgency, Frequency and Urge related incontinence.\par \par Please decrease or stop the use of the following:\par \par 1. Coffee (Caffeinated and decaffeinated)\par \par 2. Teas (Caffeinated, decaffeinated, Ice tea, and green teas\par \par 3. All sodas (Caffeinated, decaffeinated, energy drinks)\par \par 4. All carbonated drinks including seltzer water\par \par 5. All citric fruit juices\par \par 6. Water with lemon or lime\par \par 7. Spicy foods\par \par 8. Tomato Sauce based foods\par \par 9. Chocolate and chocolate containing products\par \par 10. All alcohol\par

## 2022-03-29 ENCOUNTER — NON-APPOINTMENT (OUTPATIENT)
Age: 77
End: 2022-03-29

## 2022-04-28 ENCOUNTER — NON-APPOINTMENT (OUTPATIENT)
Age: 77
End: 2022-04-28

## 2022-04-30 ENCOUNTER — EMERGENCY (EMERGENCY)
Facility: HOSPITAL | Age: 77
LOS: 0 days | Discharge: HOME | End: 2022-04-30
Attending: EMERGENCY MEDICINE | Admitting: EMERGENCY MEDICINE
Payer: MEDICARE

## 2022-04-30 VITALS
RESPIRATION RATE: 17 BRPM | SYSTOLIC BLOOD PRESSURE: 146 MMHG | OXYGEN SATURATION: 98 % | HEIGHT: 68 IN | TEMPERATURE: 98 F | DIASTOLIC BLOOD PRESSURE: 67 MMHG | WEIGHT: 169.98 LBS | HEART RATE: 80 BPM

## 2022-04-30 DIAGNOSIS — E78.00 PURE HYPERCHOLESTEROLEMIA, UNSPECIFIED: ICD-10-CM

## 2022-04-30 DIAGNOSIS — L03.115 CELLULITIS OF RIGHT LOWER LIMB: ICD-10-CM

## 2022-04-30 DIAGNOSIS — I10 ESSENTIAL (PRIMARY) HYPERTENSION: ICD-10-CM

## 2022-04-30 DIAGNOSIS — F32.A DEPRESSION, UNSPECIFIED: ICD-10-CM

## 2022-04-30 DIAGNOSIS — Z87.39 PERSONAL HISTORY OF OTHER DISEASES OF THE MUSCULOSKELETAL SYSTEM AND CONNECTIVE TISSUE: ICD-10-CM

## 2022-04-30 DIAGNOSIS — M79.661 PAIN IN RIGHT LOWER LEG: ICD-10-CM

## 2022-04-30 DIAGNOSIS — Z88.0 ALLERGY STATUS TO PENICILLIN: ICD-10-CM

## 2022-04-30 DIAGNOSIS — Z79.82 LONG TERM (CURRENT) USE OF ASPIRIN: ICD-10-CM

## 2022-04-30 DIAGNOSIS — Z90.49 ACQUIRED ABSENCE OF OTHER SPECIFIED PARTS OF DIGESTIVE TRACT: ICD-10-CM

## 2022-04-30 DIAGNOSIS — E78.5 HYPERLIPIDEMIA, UNSPECIFIED: ICD-10-CM

## 2022-04-30 DIAGNOSIS — Z90.49 ACQUIRED ABSENCE OF OTHER SPECIFIED PARTS OF DIGESTIVE TRACT: Chronic | ICD-10-CM

## 2022-04-30 LAB
ALBUMIN SERPL ELPH-MCNC: 4.2 G/DL — SIGNIFICANT CHANGE UP (ref 3.5–5.2)
ALP SERPL-CCNC: 76 U/L — SIGNIFICANT CHANGE UP (ref 30–115)
ALT FLD-CCNC: 20 U/L — SIGNIFICANT CHANGE UP (ref 0–41)
ANION GAP SERPL CALC-SCNC: 13 MMOL/L — SIGNIFICANT CHANGE UP (ref 7–14)
APTT BLD: 28 SEC — SIGNIFICANT CHANGE UP (ref 27–39.2)
AST SERPL-CCNC: 21 U/L — SIGNIFICANT CHANGE UP (ref 0–41)
BASOPHILS # BLD AUTO: 0.03 K/UL — SIGNIFICANT CHANGE UP (ref 0–0.2)
BASOPHILS NFR BLD AUTO: 0.5 % — SIGNIFICANT CHANGE UP (ref 0–1)
BILIRUB SERPL-MCNC: 0.3 MG/DL — SIGNIFICANT CHANGE UP (ref 0.2–1.2)
BUN SERPL-MCNC: 20 MG/DL — SIGNIFICANT CHANGE UP (ref 10–20)
CALCIUM SERPL-MCNC: 10.2 MG/DL — HIGH (ref 8.5–10.1)
CHLORIDE SERPL-SCNC: 101 MMOL/L — SIGNIFICANT CHANGE UP (ref 98–110)
CO2 SERPL-SCNC: 27 MMOL/L — SIGNIFICANT CHANGE UP (ref 17–32)
CREAT SERPL-MCNC: 0.8 MG/DL — SIGNIFICANT CHANGE UP (ref 0.7–1.5)
EGFR: 75 ML/MIN/1.73M2 — SIGNIFICANT CHANGE UP
EOSINOPHIL # BLD AUTO: 0.15 K/UL — SIGNIFICANT CHANGE UP (ref 0–0.7)
EOSINOPHIL NFR BLD AUTO: 2.6 % — SIGNIFICANT CHANGE UP (ref 0–8)
GLUCOSE SERPL-MCNC: 108 MG/DL — HIGH (ref 70–99)
HCT VFR BLD CALC: 38 % — SIGNIFICANT CHANGE UP (ref 37–47)
HGB BLD-MCNC: 12.2 G/DL — SIGNIFICANT CHANGE UP (ref 12–16)
IMM GRANULOCYTES NFR BLD AUTO: 0.3 % — SIGNIFICANT CHANGE UP (ref 0.1–0.3)
INR BLD: 0.99 RATIO — SIGNIFICANT CHANGE UP (ref 0.65–1.3)
LYMPHOCYTES # BLD AUTO: 1.28 K/UL — SIGNIFICANT CHANGE UP (ref 1.2–3.4)
LYMPHOCYTES # BLD AUTO: 22.2 % — SIGNIFICANT CHANGE UP (ref 20.5–51.1)
MCHC RBC-ENTMCNC: 26.2 PG — LOW (ref 27–31)
MCHC RBC-ENTMCNC: 32.1 G/DL — SIGNIFICANT CHANGE UP (ref 32–37)
MCV RBC AUTO: 81.5 FL — SIGNIFICANT CHANGE UP (ref 81–99)
MONOCYTES # BLD AUTO: 0.67 K/UL — HIGH (ref 0.1–0.6)
MONOCYTES NFR BLD AUTO: 11.6 % — HIGH (ref 1.7–9.3)
NEUTROPHILS # BLD AUTO: 3.62 K/UL — SIGNIFICANT CHANGE UP (ref 1.4–6.5)
NEUTROPHILS NFR BLD AUTO: 62.8 % — SIGNIFICANT CHANGE UP (ref 42.2–75.2)
NRBC # BLD: 0 /100 WBCS — SIGNIFICANT CHANGE UP (ref 0–0)
PLATELET # BLD AUTO: 183 K/UL — SIGNIFICANT CHANGE UP (ref 130–400)
POTASSIUM SERPL-MCNC: 3.6 MMOL/L — SIGNIFICANT CHANGE UP (ref 3.5–5)
POTASSIUM SERPL-SCNC: 3.6 MMOL/L — SIGNIFICANT CHANGE UP (ref 3.5–5)
PROT SERPL-MCNC: 6.4 G/DL — SIGNIFICANT CHANGE UP (ref 6–8)
PROTHROM AB SERPL-ACNC: 11.4 SEC — SIGNIFICANT CHANGE UP (ref 9.95–12.87)
RBC # BLD: 4.66 M/UL — SIGNIFICANT CHANGE UP (ref 4.2–5.4)
RBC # FLD: 14.4 % — SIGNIFICANT CHANGE UP (ref 11.5–14.5)
SODIUM SERPL-SCNC: 141 MMOL/L — SIGNIFICANT CHANGE UP (ref 135–146)
WBC # BLD: 5.77 K/UL — SIGNIFICANT CHANGE UP (ref 4.8–10.8)
WBC # FLD AUTO: 5.77 K/UL — SIGNIFICANT CHANGE UP (ref 4.8–10.8)

## 2022-04-30 PROCEDURE — 93970 EXTREMITY STUDY: CPT | Mod: 26

## 2022-04-30 PROCEDURE — 99285 EMERGENCY DEPT VISIT HI MDM: CPT

## 2022-04-30 NOTE — ED PROVIDER NOTE - OBJECTIVE STATEMENT
76-year-old female past medical history of hypertension and hyperlipidemia presenting for left right lower extremity calf pain of 1 day duration.  Pain started after descending a flight of steps.  Reports the patient's daughter noticed some redness to posterior aspect of the right calf, marked out area with a pen.  Patient denies fever chills nausea vomiting difficulty ambulating or broken skin or trauma to the right leg.  No history of DVT or PE denies anticoagulation. Does not report chest pain shortness of breath pleurisy dizziness palpitations.

## 2022-04-30 NOTE — ED PROVIDER NOTE - ATTENDING APP SHARED VISIT CONTRIBUTION OF CARE
77 yo f with pmh of htn and hld sent by [facility] for r/o dvt.  pt with R leg swelling and mild erythema since yesterday.  constant pain despite massaging.  denies sob or cp.  denies previous dvt/pe.  takes asa only.  exam: nad, ncat, perrl, eomi, mmm, rrr, ctab, RLE with swelling and erythematous petechiae imp: pt with RLE swelling and pain, will r/o dvt, duplex

## 2022-04-30 NOTE — ED ADULT NURSE NOTE - NSICDXPASTMEDICALHX_GEN_ALL_CORE_FT
normal... PAST MEDICAL HISTORY:  Depression     High cholesterol     HTN (hypertension)     OP (osteoporosis)

## 2022-04-30 NOTE — ED PROVIDER NOTE - NSFOLLOWUPINSTRUCTIONS_ED_ALL_ED_FT
-c/w baclofen MAKE AN APPOINTMENT WITH YOUR PRIMARY DOCTOR. TAKE YOUR ANTIBIOTIC AS DIRECTED. RETURN IF YOU DEVELOP FEVER CHILLS, WORSENED PAIN , REDNESS OR SWELLING.    Cellulitis    WHAT YOU NEED TO KNOW:    Cellulitis is a skin infection caused by bacteria. Cellulitis may go away on its own or you may need treatment. Your healthcare provider may draw a [place] around the outside edges of your cellulitis. If your cellulitis spreads, your healthcare provider will see it outside of the [place]. Cellulitis          DISCHARGE INSTRUCTIONS:    Call 911 if:     You have sudden trouble breathing or chest pain.        Return to the emergency department if:     Your wound gets larger and more painful.       You feel a crackling under your skin when you touch it.      You have purple dots or bumps on your skin, or you see bleeding under your skin.      You have new swelling and pain in your legs.      The red, warm, swollen area gets larger.      You see red streaks coming from the infected area.    Contact your healthcare provider if:     You have a fever.      Your fever or pain does not go away or gets worse.      The area does not get smaller after 2 days of antibiotics.      Your skin is flaking or peeling off.      You have questions or concerns about your condition or care.    Medicines:     Antibiotics help treat the bacterial infection.       NSAIDs, such as ibuprofen, help decrease swelling, pain, and fever. NSAIDs can cause stomach bleeding or kidney problems in certain people. If you take blood thinner medicine, always ask if NSAIDs are safe for you. Always read the medicine label and follow directions. Do not give these medicines to children under 6 months of age without direction from your child's healthcare provider.      Acetaminophen decreases pain and fever. It is available without a doctor's order. Ask how much to take and how often to take it. Follow directions. Read the labels of all other medicines you are using to see if they also contain acetaminophen, or ask your doctor or pharmacist. Acetaminophen can cause liver damage if not taken correctly. Do not use more than 4 grams (4,000 milligrams) total of acetaminophen in one day.       Take your medicine as directed. Contact your healthcare provider if you think your medicine is not helping or if you have side effects. Tell him or her if you are allergic to any medicine. Keep a list of the medicines, vitamins, and herbs you take. Include the amounts, and when and why you take them. Bring the list or the pill bottles to follow-up visits. Carry your medicine list with you in case of an emergency.    Self-care:     Elevate the area above the level of your heart as often as you can. This will help decrease swelling and pain. Prop the area on pillows or blankets to keep it elevated comfortably.       Clean the area daily until the wound scabs over. Gently wash the area with soap and water. Pat dry. Use dressings as directed.       Place cool or warm, wet cloths on the area as directed. Use clean cloths and clean water. Leave it on the area until the cloth is room temperature. Pat the area dry with a clean, dry cloth. The cloths may help decrease pain.     Prevent cellulitis:     Do not scratch bug bites or areas of injury. You increase your risk for cellulitis by scratching these areas.       Do not share personal items, such as towels, clothing, and razors.       Clean exercise equipment with germ-killing  before and after you use it.      Wash your hands often. Use soap and water. Wash your hands after you use the bathroom, change a child's diapers, or sneeze. Wash your hands before you prepare or eat food. Use lotion to prevent dry, cracked skin. Handwashing           Wear pressure stockings as directed. You may be told to wear the stockings if you have peripheral edema. The stockings improve blood flow and decrease swelling.      Treat athlete’s foot. This can help prevent the spread of a bacterial skin infection.    Follow up with your healthcare provider within 3 days, or as directed: Your healthcare provider will check if your cellulitis is getting better. You may need different medicine. Write down your questions so you remember to ask them during your visits.       © Copyright [employer] 2019 All illustrations and images included in CareNotes are the copyrighted property of [employer] or [employer]

## 2022-04-30 NOTE — ED PROVIDER NOTE - PHYSICAL EXAMINATION
Physical Exam    Vital Signs: I have reviewed the initial vital signs.  Constitutional: well-nourished, appears stated age, no acute distress  Eyes: Conjunctiva pink, Sclera clear,   Cardiovascular: S1 and S2, regular rate, regular rhythm, well-perfused extremities, radial pulses equal and 2+, calves equal in size, R Calf TTP  Respiratory: unlabored respiratory effort, speaking in full sentences, handling oral secretions, clear to auscultation bilaterally no wheezing, rales and rhonchi  Musculoskeletal: s moving all extremities appropriately, no gross bony deformities or swelling.  Integumentary: warm, dry, no rashes, lacerations dry and cracked skin to legs b/l, trace erythema to posterior calf   Neurologic: awake, alert,

## 2022-04-30 NOTE — ED PROVIDER NOTE - PATIENT PORTAL LINK FT
You can access the FollowMyHealth Patient Portal offered by [facility] by registering at the following website: [email or web address]. By joining [employer]’s FollowMyHealth portal, you will also be able to view your health information using other applications (apps) compatible with our system.

## 2022-06-05 ENCOUNTER — NON-APPOINTMENT (OUTPATIENT)
Age: 77
End: 2022-06-05

## 2022-06-05 RX ORDER — TROSPIUM CHLORIDE 20 MG/1
20 TABLET, FILM COATED ORAL
Qty: 60 | Refills: 1 | Status: DISCONTINUED | COMMUNITY
Start: 2022-03-29 | End: 2022-06-05

## 2022-06-05 RX ORDER — SOLIFENACIN SUCCINATE 5 MG/1
5 TABLET ORAL
Qty: 30 | Refills: 3 | Status: DISCONTINUED | COMMUNITY
Start: 2022-04-07 | End: 2022-06-05

## 2022-06-05 RX ORDER — OXYBUTYNIN CHLORIDE 5 MG/1
5 TABLET, EXTENDED RELEASE ORAL
Qty: 30 | Refills: 3 | Status: DISCONTINUED | COMMUNITY
Start: 2022-02-15 | End: 2022-06-05

## 2022-06-09 ENCOUNTER — APPOINTMENT (OUTPATIENT)
Dept: UROGYNECOLOGY | Facility: CLINIC | Age: 77
End: 2022-06-09

## 2022-08-01 NOTE — ED ADULT NURSE NOTE - FINAL NURSING ELECTRONIC SIGNATURE
Nurse note from patient's weekly  LCD  class was reviewed. Pertinent medical concerns were:   reviewed     BP Readings from Last 3 Encounters:   07/19/22 (!) 153/82   07/08/22 133/80   06/22/22 (!) 165/92       Weight Metrics 7/19/2022 7/19/2022 7/8/2022 6/22/2022 6/22/2022 6/6/2022 5/23/2022   Weight - 227 lb 4.8 oz 225 lb 14.4 oz - 227 lb 1.6 oz 234 lb 1.6 oz 234 lb 3.2 oz   Neck Circ (inches) 15 - - 15.5 - - -   Waist Measure Inches 45 - - 44.75 - - -   Body Fat % 48.2 - - 47.9 - - -   BMI - 42.95 kg/m2 42.68 kg/m2 - 42.91 kg/m2 44.23 kg/m2 44.25 kg/m2       Current Outpatient Medications   Medication Sig Dispense Refill    magnesium 250 mg tab Take 1 Tablet by mouth nightly. irbesartan (AVAPRO) 300 mg tablet TAKE 1 TABLET BY MOUTH EVERY DAY AT NIGHT 90 Tablet 1    hydroCHLOROthiazide (HYDRODIURIL) 12.5 mg tablet TAKE 1 TABLET BY MOUTH EVERY DAY 90 Tablet 2    YUVAFEM 10 mcg tab vaginal tablet Insert 10 mcg into vagina two (2) times a week. Comp. Stocking,Knee,Regular,Lrg misc 20-30mmHG 1 Units 2    meloxicam (MOBIC) 15 mg tablet TAKE 1 TABLET BY MOUTH EVERY DAY AS NEEDED FOR PAIN 30 Tablet 0    acetaminophen (Tylenol Arthritis Pain) 650 mg TbER Take 1,300 mg by mouth daily. furosemide (LASIX) 20 mg tablet TAKE 1 TABLET BY MOUTH DAILY AS NEEDED 30 Tablet 1    multivitamin (ONE A DAY) tablet Take 1 Tablet by mouth daily. 31-Jan-2022 21:19

## 2022-08-05 RX ORDER — SOLIFENACIN SUCCINATE 10 MG/1
10 TABLET ORAL
Qty: 30 | Refills: 1 | Status: DISCONTINUED | COMMUNITY
Start: 2022-06-05 | End: 2022-08-05

## 2022-08-08 ENCOUNTER — NON-APPOINTMENT (OUTPATIENT)
Age: 77
End: 2022-08-08

## 2022-08-12 ENCOUNTER — NON-APPOINTMENT (OUTPATIENT)
Age: 77
End: 2022-08-12

## 2022-09-01 RX ORDER — DARIFENACIN HYDROBROMIDE 7.5 MG/1
7.5 TABLET, EXTENDED RELEASE ORAL
Qty: 30 | Refills: 1 | Status: DISCONTINUED | COMMUNITY
Start: 2022-08-05 | End: 2022-09-01

## 2022-09-02 ENCOUNTER — NON-APPOINTMENT (OUTPATIENT)
Age: 77
End: 2022-09-02

## 2022-09-29 NOTE — ED PROVIDER NOTE - PROGRESS NOTE ADDITIONAL3
Pt is here for a flu shot , given in the left thigh . Tolerated well.    Additional Progress Note...

## 2022-10-25 ENCOUNTER — OUTPATIENT (OUTPATIENT)
Dept: OUTPATIENT SERVICES | Facility: HOSPITAL | Age: 77
LOS: 1 days | Discharge: HOME | End: 2022-10-25

## 2022-10-25 ENCOUNTER — APPOINTMENT (OUTPATIENT)
Dept: UROGYNECOLOGY | Facility: CLINIC | Age: 77
End: 2022-10-25

## 2022-10-25 VITALS
HEART RATE: 86 BPM | DIASTOLIC BLOOD PRESSURE: 83 MMHG | WEIGHT: 172 LBS | SYSTOLIC BLOOD PRESSURE: 134 MMHG | BODY MASS INDEX: 33.77 KG/M2 | HEIGHT: 60 IN

## 2022-10-25 DIAGNOSIS — Z87.898 PERSONAL HISTORY OF OTHER SPECIFIED CONDITIONS: ICD-10-CM

## 2022-10-25 DIAGNOSIS — Z12.31 ENCOUNTER FOR SCREENING MAMMOGRAM FOR MALIGNANT NEOPLASM OF BREAST: ICD-10-CM

## 2022-10-25 DIAGNOSIS — N39.41 URGE INCONTINENCE: ICD-10-CM

## 2022-10-25 DIAGNOSIS — R15.2 FECAL URGENCY: ICD-10-CM

## 2022-10-25 DIAGNOSIS — Z90.49 ACQUIRED ABSENCE OF OTHER SPECIFIED PARTS OF DIGESTIVE TRACT: Chronic | ICD-10-CM

## 2022-10-25 PROCEDURE — 77067 SCR MAMMO BI INCL CAD: CPT | Mod: 26

## 2022-10-25 PROCEDURE — 77063 BREAST TOMOSYNTHESIS BI: CPT | Mod: 26

## 2022-10-25 PROCEDURE — 99213 OFFICE O/P EST LOW 20 MIN: CPT

## 2022-10-25 RX ORDER — BETAMETHASONE DIPROPIONATE 0.5 MG/G
0.05 CREAM, AUGMENTED TOPICAL
Qty: 50 | Refills: 0 | Status: COMPLETED | COMMUNITY
Start: 2021-10-20 | End: 2022-10-25

## 2022-10-31 NOTE — COUNSELING
[FreeTextEntry1] : If you feel like you have an infection it is important for you to call our office and we will arrange testing of your urine.\par \par Please STOP taking Myrbetriq 25 mg.\par \par Please begin taking Myrbetriq 50 mg. It takes up to 6 weeks to go into full effect. Please  your refill when you complete the 1st bottle.\par \par Please call my office if you have any issues with the cost or side effects of the medication. \par \par Schedule a 6 weeks follow up med check appointment.\par

## 2022-10-31 NOTE — HISTORY OF PRESENT ILLNESS
[FreeTextEntry1] : Patient is here for 6 weeks med check for urge incontinence.\par Last seen on 2/15/22 for med check.\par \par Current day smoker for past 50 years, currently trying to cut down. \par \par s/p Oxybutynin ER 5mg : no benefit\par Toviaz- needs an auth\par myrbetriq-65$\par gemtesa 150$ \par \par s/p Vesicare 10mg\par Darifenacin: not covered\par Trospium: not covered\par \par Estrace cream\par \par Today, patient states that Myrbetriq 25mg helped minimally. No side effects. Patient does not feel she has an infection.\par \par Patient would like to increase Myrbetriq to 50mg.\par

## 2022-10-31 NOTE — DISCUSSION/SUMMARY
[FreeTextEntry1] : Urge/Mixed Incontinence-\par Rx Myrbetriq 50mg QD, 30 days and 1 refill\par Precautions reviewed.\par Will return in 6 weeks for follow up or earlier if she has any issues.\par \par Atrophic Vaginitis:\par Advised to continue to apply a pea size amount of the cream to the opening of the vagina three nights per week.\par

## 2022-12-08 ENCOUNTER — APPOINTMENT (OUTPATIENT)
Dept: UROGYNECOLOGY | Facility: CLINIC | Age: 77
End: 2022-12-08

## 2022-12-08 VITALS
DIASTOLIC BLOOD PRESSURE: 78 MMHG | HEIGHT: 60 IN | HEART RATE: 82 BPM | BODY MASS INDEX: 33.77 KG/M2 | WEIGHT: 172 LBS | SYSTOLIC BLOOD PRESSURE: 148 MMHG

## 2022-12-08 DIAGNOSIS — N95.2 POSTMENOPAUSAL ATROPHIC VAGINITIS: ICD-10-CM

## 2022-12-08 PROCEDURE — 99214 OFFICE O/P EST MOD 30 MIN: CPT

## 2022-12-13 NOTE — DISCUSSION/SUMMARY
[FreeTextEntry1] : \par Urinary frequency, nocturia\par D/C Myrbetriq 50 mg \par Counseled the patient that I recommend further evaluation with UDS and cysto. She agrees to UDS and cysto, but is not interested in third line therapies at this time. \par \par Atrophic Vaginitis:\par Advised to apply a pea size amount of the cream to the inside of the vagina three nights per week.\par

## 2022-12-13 NOTE — HISTORY OF PRESENT ILLNESS
[FreeTextEntry1] : Patient is here for 6 weeks med check for urge incontinence.\par Last seen on 10/25/2022 for med check.\par \par Current day smoker for past 50 years, currently trying to cut down. \par \par s/p Oxybutynin ER 5mg : no benefit\par Toviaz- needs an auth\par myrbetriq-65$\par gemtesa 150$ \par \par s/p Vesicare 10mg\par Darifenacin: not covered\par Trospium: not covered\par \par Estrace cream\par Myrbetriq 25 mg, minimal improvement\par Myrbetriq 50 mg \par \par Today, patient states she sees no change or improvement with Myrbetriq 50 mg. Denies side effects. Patient does not feel she has an infection.\par \par She was unaware to continue estrace so she stopped it but will start using it again. \par \par \par

## 2022-12-13 NOTE — COUNSELING
[FreeTextEntry1] : \par Schedule bladder function testing (UDS without reduction) with YEHUDA Mancia or YEHUDA Duggan \par \par Please call the office if you feel like you have an infection because we cannot do the bladder function testing in the setting of an infection.\par \par Please come with a full, not painful bladder.\par \par If you feel like you have an infection it is important for you to call our office and we will arrange testing of your urine.\par \par Please STOP taking Myrbetriq 50 mg.\par \par Please continue to apply a pea size amount to the opening of the vagina three times a week. \par \par Please call my office if you have any issues with the cost or side effects of the medication. \par \par \par

## 2022-12-29 ENCOUNTER — APPOINTMENT (OUTPATIENT)
Dept: UROGYNECOLOGY | Facility: CLINIC | Age: 77
End: 2022-12-29

## 2022-12-29 VITALS
HEART RATE: 75 BPM | HEIGHT: 60 IN | SYSTOLIC BLOOD PRESSURE: 139 MMHG | BODY MASS INDEX: 33.77 KG/M2 | DIASTOLIC BLOOD PRESSURE: 71 MMHG | WEIGHT: 172 LBS

## 2022-12-29 LAB
BILIRUB UR QL STRIP: NEGATIVE
CLARITY UR: CLEAR
COLLECTION METHOD: NORMAL
GLUCOSE UR-MCNC: NEGATIVE
HCG UR QL: 0.2 EU/DL
HGB UR QL STRIP.AUTO: NORMAL
KETONES UR-MCNC: NEGATIVE
LEUKOCYTE ESTERASE UR QL STRIP: NEGATIVE
NITRITE UR QL STRIP: NEGATIVE
PH UR STRIP: 6
PROT UR STRIP-MCNC: NEGATIVE
SP GR UR STRIP: 1.02

## 2022-12-29 PROCEDURE — 51784 ANAL/URINARY MUSCLE STUDY: CPT

## 2022-12-29 PROCEDURE — 51729 CYSTOMETROGRAM W/VP&UP: CPT

## 2022-12-29 PROCEDURE — 51797 INTRAABDOMINAL PRESSURE TEST: CPT

## 2022-12-29 PROCEDURE — 81003 URINALYSIS AUTO W/O SCOPE: CPT | Mod: QW

## 2022-12-29 RX ORDER — MIRABEGRON 50 MG/1
50 TABLET, FILM COATED, EXTENDED RELEASE ORAL
Qty: 30 | Refills: 1 | Status: COMPLETED | COMMUNITY
Start: 2022-09-01 | End: 2022-12-29

## 2023-01-10 ENCOUNTER — APPOINTMENT (OUTPATIENT)
Dept: UROGYNECOLOGY | Facility: CLINIC | Age: 78
End: 2023-01-10
Payer: MEDICARE

## 2023-01-10 VITALS
HEART RATE: 84 BPM | BODY MASS INDEX: 33.77 KG/M2 | SYSTOLIC BLOOD PRESSURE: 166 MMHG | HEIGHT: 60 IN | DIASTOLIC BLOOD PRESSURE: 81 MMHG | WEIGHT: 172 LBS

## 2023-01-10 DIAGNOSIS — R35.0 FREQUENCY OF MICTURITION: ICD-10-CM

## 2023-01-10 DIAGNOSIS — R35.1 NOCTURIA: ICD-10-CM

## 2023-01-10 DIAGNOSIS — N39.41 URGE INCONTINENCE: ICD-10-CM

## 2023-01-10 LAB
BILIRUB UR QL STRIP: NEGATIVE
CLARITY UR: CLEAR
COLLECTION METHOD: NORMAL
GLUCOSE UR-MCNC: NEGATIVE
HCG UR QL: 0.2 EU/DL
HGB UR QL STRIP.AUTO: NEGATIVE
KETONES UR-MCNC: NEGATIVE
LEUKOCYTE ESTERASE UR QL STRIP: NEGATIVE
NITRITE UR QL STRIP: NEGATIVE
PH UR STRIP: 5.5
PROT UR STRIP-MCNC: NEGATIVE
SP GR UR STRIP: 1.02

## 2023-01-10 PROCEDURE — 99214 OFFICE O/P EST MOD 30 MIN: CPT | Mod: 25

## 2023-01-10 PROCEDURE — 81003 URINALYSIS AUTO W/O SCOPE: CPT | Mod: QW

## 2023-01-10 PROCEDURE — 52000 CYSTOURETHROSCOPY: CPT

## 2023-01-10 RX ORDER — CLINDAMYCIN HYDROCHLORIDE 300 MG/1
300 CAPSULE ORAL
Qty: 30 | Refills: 0 | Status: COMPLETED | COMMUNITY
Start: 2022-02-01 | End: 2023-01-10

## 2023-01-10 RX ORDER — NITROFURANTOIN (MONOHYDRATE/MACROCRYSTALS) 25; 75 MG/1; MG/1
100 CAPSULE ORAL
Qty: 6 | Refills: 0 | Status: ACTIVE | COMMUNITY
Start: 2023-01-10 | End: 1900-01-01

## 2023-01-13 LAB — BACTERIA UR CULT: NORMAL

## 2023-01-30 ENCOUNTER — APPOINTMENT (OUTPATIENT)
Dept: UROGYNECOLOGY | Facility: CLINIC | Age: 78
End: 2023-01-30

## 2023-03-31 ENCOUNTER — OUTPATIENT (OUTPATIENT)
Dept: OUTPATIENT SERVICES | Facility: HOSPITAL | Age: 78
LOS: 1 days | End: 2023-03-31
Payer: MEDICARE

## 2023-03-31 DIAGNOSIS — Z90.49 ACQUIRED ABSENCE OF OTHER SPECIFIED PARTS OF DIGESTIVE TRACT: Chronic | ICD-10-CM

## 2023-03-31 DIAGNOSIS — Z00.8 ENCOUNTER FOR OTHER GENERAL EXAMINATION: ICD-10-CM

## 2023-03-31 DIAGNOSIS — Z13.820 ENCOUNTER FOR SCREENING FOR OSTEOPOROSIS: ICD-10-CM

## 2023-03-31 PROCEDURE — 77080 DXA BONE DENSITY AXIAL: CPT

## 2023-04-01 DIAGNOSIS — Z13.820 ENCOUNTER FOR SCREENING FOR OSTEOPOROSIS: ICD-10-CM

## 2023-04-03 NOTE — ED ADULT NURSE NOTE - HISTORY OF COVID-19 VACCINATION
Outreach attempt was made to schedule a Medicare Wellness Visit. This was the first attempt. Contact was made, [facility] appointment scheduled.   Yes

## 2023-05-10 ENCOUNTER — RX RENEWAL (OUTPATIENT)
Age: 78
End: 2023-05-10

## 2023-06-02 ENCOUNTER — OUTPATIENT (OUTPATIENT)
Dept: OUTPATIENT SERVICES | Facility: HOSPITAL | Age: 78
LOS: 1 days | End: 2023-06-02
Payer: MEDICARE

## 2023-06-02 DIAGNOSIS — Z00.8 ENCOUNTER FOR OTHER GENERAL EXAMINATION: ICD-10-CM

## 2023-06-02 DIAGNOSIS — R10.2 PELVIC AND PERINEAL PAIN: ICD-10-CM

## 2023-06-02 DIAGNOSIS — Z90.49 ACQUIRED ABSENCE OF OTHER SPECIFIED PARTS OF DIGESTIVE TRACT: Chronic | ICD-10-CM

## 2023-06-02 PROCEDURE — 76856 US EXAM PELVIC COMPLETE: CPT

## 2023-06-02 PROCEDURE — 76830 TRANSVAGINAL US NON-OB: CPT

## 2023-06-02 PROCEDURE — 76830 TRANSVAGINAL US NON-OB: CPT | Mod: 26

## 2023-06-02 PROCEDURE — 76856 US EXAM PELVIC COMPLETE: CPT | Mod: 26

## 2023-06-03 DIAGNOSIS — R10.2 PELVIC AND PERINEAL PAIN: ICD-10-CM

## 2023-06-27 ENCOUNTER — OUTPATIENT (OUTPATIENT)
Dept: OUTPATIENT SERVICES | Facility: HOSPITAL | Age: 78
LOS: 1 days | End: 2023-06-27
Payer: MEDICARE

## 2023-06-27 DIAGNOSIS — Z90.49 ACQUIRED ABSENCE OF OTHER SPECIFIED PARTS OF DIGESTIVE TRACT: Chronic | ICD-10-CM

## 2023-06-27 DIAGNOSIS — R42 DIZZINESS AND GIDDINESS: ICD-10-CM

## 2023-06-27 PROCEDURE — 93880 EXTRACRANIAL BILAT STUDY: CPT | Mod: 26

## 2023-06-27 PROCEDURE — 93880 EXTRACRANIAL BILAT STUDY: CPT

## 2023-06-28 DIAGNOSIS — R42 DIZZINESS AND GIDDINESS: ICD-10-CM

## 2023-09-05 ENCOUNTER — OUTPATIENT (OUTPATIENT)
Dept: OUTPATIENT SERVICES | Facility: HOSPITAL | Age: 78
LOS: 1 days | End: 2023-09-05
Payer: MEDICARE

## 2023-09-05 DIAGNOSIS — Z00.8 ENCOUNTER FOR OTHER GENERAL EXAMINATION: ICD-10-CM

## 2023-09-05 DIAGNOSIS — R10.2 PELVIC AND PERINEAL PAIN: ICD-10-CM

## 2023-09-05 DIAGNOSIS — Z90.49 ACQUIRED ABSENCE OF OTHER SPECIFIED PARTS OF DIGESTIVE TRACT: Chronic | ICD-10-CM

## 2023-09-05 PROCEDURE — 72195 MRI PELVIS W/O DYE: CPT | Mod: 26

## 2023-09-05 PROCEDURE — 72195 MRI PELVIS W/O DYE: CPT

## 2023-09-06 DIAGNOSIS — R10.2 PELVIC AND PERINEAL PAIN: ICD-10-CM

## 2023-11-14 ENCOUNTER — OUTPATIENT (OUTPATIENT)
Dept: OUTPATIENT SERVICES | Facility: HOSPITAL | Age: 78
LOS: 1 days | End: 2023-11-14
Payer: MEDICARE

## 2023-11-14 DIAGNOSIS — R22.0 LOCALIZED SWELLING, MASS AND LUMP, HEAD: ICD-10-CM

## 2023-11-14 DIAGNOSIS — Z90.49 ACQUIRED ABSENCE OF OTHER SPECIFIED PARTS OF DIGESTIVE TRACT: Chronic | ICD-10-CM

## 2023-11-14 DIAGNOSIS — Z00.8 ENCOUNTER FOR OTHER GENERAL EXAMINATION: ICD-10-CM

## 2023-11-14 PROCEDURE — 71046 X-RAY EXAM CHEST 2 VIEWS: CPT | Mod: 26

## 2023-11-14 PROCEDURE — 71046 X-RAY EXAM CHEST 2 VIEWS: CPT

## 2023-11-14 PROCEDURE — 76536 US EXAM OF HEAD AND NECK: CPT | Mod: 26

## 2023-11-14 PROCEDURE — 71100 X-RAY EXAM RIBS UNI 2 VIEWS: CPT | Mod: RT

## 2023-11-14 PROCEDURE — 71100 X-RAY EXAM RIBS UNI 2 VIEWS: CPT | Mod: 26,RT

## 2023-11-14 PROCEDURE — 76536 US EXAM OF HEAD AND NECK: CPT

## 2023-11-15 DIAGNOSIS — R22.0 LOCALIZED SWELLING, MASS AND LUMP, HEAD: ICD-10-CM

## 2023-12-02 ENCOUNTER — EMERGENCY (EMERGENCY)
Facility: HOSPITAL | Age: 78
LOS: 0 days | Discharge: HOME | End: 2023-12-02
Attending: EMERGENCY MEDICINE
Payer: MEDICARE

## 2023-12-02 VITALS
WEIGHT: 199.96 LBS | TEMPERATURE: 99 F | RESPIRATION RATE: 18 BRPM | SYSTOLIC BLOOD PRESSURE: 141 MMHG | OXYGEN SATURATION: 98 % | HEART RATE: 78 BPM | DIASTOLIC BLOOD PRESSURE: 63 MMHG

## 2023-12-02 DIAGNOSIS — R60.0 LOCALIZED EDEMA: ICD-10-CM

## 2023-12-02 DIAGNOSIS — F17.200 NICOTINE DEPENDENCE, UNSPECIFIED, UNCOMPLICATED: ICD-10-CM

## 2023-12-02 DIAGNOSIS — I82.462 ACUTE EMBOLISM AND THROMBOSIS OF LEFT CALF MUSCULAR VEIN: ICD-10-CM

## 2023-12-02 DIAGNOSIS — I10 ESSENTIAL (PRIMARY) HYPERTENSION: ICD-10-CM

## 2023-12-02 DIAGNOSIS — Z90.49 ACQUIRED ABSENCE OF OTHER SPECIFIED PARTS OF DIGESTIVE TRACT: Chronic | ICD-10-CM

## 2023-12-02 DIAGNOSIS — E78.5 HYPERLIPIDEMIA, UNSPECIFIED: ICD-10-CM

## 2023-12-02 DIAGNOSIS — R06.02 SHORTNESS OF BREATH: ICD-10-CM

## 2023-12-02 LAB
ALBUMIN SERPL ELPH-MCNC: 3.6 G/DL — SIGNIFICANT CHANGE UP (ref 3.5–5.2)
ALBUMIN SERPL ELPH-MCNC: 3.6 G/DL — SIGNIFICANT CHANGE UP (ref 3.5–5.2)
ALP SERPL-CCNC: 106 U/L — SIGNIFICANT CHANGE UP (ref 30–115)
ALP SERPL-CCNC: 106 U/L — SIGNIFICANT CHANGE UP (ref 30–115)
ALT FLD-CCNC: 24 U/L — SIGNIFICANT CHANGE UP (ref 0–41)
ALT FLD-CCNC: 24 U/L — SIGNIFICANT CHANGE UP (ref 0–41)
ANION GAP SERPL CALC-SCNC: 14 MMOL/L — SIGNIFICANT CHANGE UP (ref 7–14)
ANION GAP SERPL CALC-SCNC: 14 MMOL/L — SIGNIFICANT CHANGE UP (ref 7–14)
APTT BLD: 26.7 SEC — LOW (ref 27–39.2)
APTT BLD: 26.7 SEC — LOW (ref 27–39.2)
AST SERPL-CCNC: 28 U/L — SIGNIFICANT CHANGE UP (ref 0–41)
AST SERPL-CCNC: 28 U/L — SIGNIFICANT CHANGE UP (ref 0–41)
BASOPHILS # BLD AUTO: 0.06 K/UL — SIGNIFICANT CHANGE UP (ref 0–0.2)
BASOPHILS # BLD AUTO: 0.06 K/UL — SIGNIFICANT CHANGE UP (ref 0–0.2)
BASOPHILS NFR BLD AUTO: 0.7 % — SIGNIFICANT CHANGE UP (ref 0–1)
BASOPHILS NFR BLD AUTO: 0.7 % — SIGNIFICANT CHANGE UP (ref 0–1)
BILIRUB SERPL-MCNC: 0.2 MG/DL — SIGNIFICANT CHANGE UP (ref 0.2–1.2)
BILIRUB SERPL-MCNC: 0.2 MG/DL — SIGNIFICANT CHANGE UP (ref 0.2–1.2)
BUN SERPL-MCNC: 21 MG/DL — HIGH (ref 10–20)
BUN SERPL-MCNC: 21 MG/DL — HIGH (ref 10–20)
CALCIUM SERPL-MCNC: 10.3 MG/DL — SIGNIFICANT CHANGE UP (ref 8.4–10.5)
CALCIUM SERPL-MCNC: 10.3 MG/DL — SIGNIFICANT CHANGE UP (ref 8.4–10.5)
CHLORIDE SERPL-SCNC: 104 MMOL/L — SIGNIFICANT CHANGE UP (ref 98–110)
CHLORIDE SERPL-SCNC: 104 MMOL/L — SIGNIFICANT CHANGE UP (ref 98–110)
CO2 SERPL-SCNC: 24 MMOL/L — SIGNIFICANT CHANGE UP (ref 17–32)
CO2 SERPL-SCNC: 24 MMOL/L — SIGNIFICANT CHANGE UP (ref 17–32)
CREAT SERPL-MCNC: 0.8 MG/DL — SIGNIFICANT CHANGE UP (ref 0.7–1.5)
CREAT SERPL-MCNC: 0.8 MG/DL — SIGNIFICANT CHANGE UP (ref 0.7–1.5)
EGFR: 74 ML/MIN/1.73M2 — SIGNIFICANT CHANGE UP
EGFR: 74 ML/MIN/1.73M2 — SIGNIFICANT CHANGE UP
EOSINOPHIL # BLD AUTO: 0.22 K/UL — SIGNIFICANT CHANGE UP (ref 0–0.7)
EOSINOPHIL # BLD AUTO: 0.22 K/UL — SIGNIFICANT CHANGE UP (ref 0–0.7)
EOSINOPHIL NFR BLD AUTO: 2.7 % — SIGNIFICANT CHANGE UP (ref 0–8)
EOSINOPHIL NFR BLD AUTO: 2.7 % — SIGNIFICANT CHANGE UP (ref 0–8)
GLUCOSE SERPL-MCNC: 93 MG/DL — SIGNIFICANT CHANGE UP (ref 70–99)
GLUCOSE SERPL-MCNC: 93 MG/DL — SIGNIFICANT CHANGE UP (ref 70–99)
HCT VFR BLD CALC: 36.4 % — LOW (ref 37–47)
HCT VFR BLD CALC: 36.4 % — LOW (ref 37–47)
HGB BLD-MCNC: 11.3 G/DL — LOW (ref 12–16)
HGB BLD-MCNC: 11.3 G/DL — LOW (ref 12–16)
IMM GRANULOCYTES NFR BLD AUTO: 0.7 % — HIGH (ref 0.1–0.3)
IMM GRANULOCYTES NFR BLD AUTO: 0.7 % — HIGH (ref 0.1–0.3)
INR BLD: 0.97 RATIO — SIGNIFICANT CHANGE UP (ref 0.65–1.3)
INR BLD: 0.97 RATIO — SIGNIFICANT CHANGE UP (ref 0.65–1.3)
LYMPHOCYTES # BLD AUTO: 1.11 K/UL — LOW (ref 1.2–3.4)
LYMPHOCYTES # BLD AUTO: 1.11 K/UL — LOW (ref 1.2–3.4)
LYMPHOCYTES # BLD AUTO: 13.9 % — LOW (ref 20.5–51.1)
LYMPHOCYTES # BLD AUTO: 13.9 % — LOW (ref 20.5–51.1)
MAGNESIUM SERPL-MCNC: 2.1 MG/DL — SIGNIFICANT CHANGE UP (ref 1.8–2.4)
MAGNESIUM SERPL-MCNC: 2.1 MG/DL — SIGNIFICANT CHANGE UP (ref 1.8–2.4)
MCHC RBC-ENTMCNC: 26.2 PG — LOW (ref 27–31)
MCHC RBC-ENTMCNC: 26.2 PG — LOW (ref 27–31)
MCHC RBC-ENTMCNC: 31 G/DL — LOW (ref 32–37)
MCHC RBC-ENTMCNC: 31 G/DL — LOW (ref 32–37)
MCV RBC AUTO: 84.3 FL — SIGNIFICANT CHANGE UP (ref 81–99)
MCV RBC AUTO: 84.3 FL — SIGNIFICANT CHANGE UP (ref 81–99)
MONOCYTES # BLD AUTO: 0.72 K/UL — HIGH (ref 0.1–0.6)
MONOCYTES # BLD AUTO: 0.72 K/UL — HIGH (ref 0.1–0.6)
MONOCYTES NFR BLD AUTO: 9 % — SIGNIFICANT CHANGE UP (ref 1.7–9.3)
MONOCYTES NFR BLD AUTO: 9 % — SIGNIFICANT CHANGE UP (ref 1.7–9.3)
NEUTROPHILS # BLD AUTO: 5.84 K/UL — SIGNIFICANT CHANGE UP (ref 1.4–6.5)
NEUTROPHILS # BLD AUTO: 5.84 K/UL — SIGNIFICANT CHANGE UP (ref 1.4–6.5)
NEUTROPHILS NFR BLD AUTO: 73 % — SIGNIFICANT CHANGE UP (ref 42.2–75.2)
NEUTROPHILS NFR BLD AUTO: 73 % — SIGNIFICANT CHANGE UP (ref 42.2–75.2)
NRBC # BLD: 0 /100 WBCS — SIGNIFICANT CHANGE UP (ref 0–0)
NRBC # BLD: 0 /100 WBCS — SIGNIFICANT CHANGE UP (ref 0–0)
NT-PROBNP SERPL-SCNC: 152 PG/ML — SIGNIFICANT CHANGE UP (ref 0–300)
NT-PROBNP SERPL-SCNC: 152 PG/ML — SIGNIFICANT CHANGE UP (ref 0–300)
PLATELET # BLD AUTO: 278 K/UL — SIGNIFICANT CHANGE UP (ref 130–400)
PLATELET # BLD AUTO: 278 K/UL — SIGNIFICANT CHANGE UP (ref 130–400)
PMV BLD: 10.1 FL — SIGNIFICANT CHANGE UP (ref 7.4–10.4)
PMV BLD: 10.1 FL — SIGNIFICANT CHANGE UP (ref 7.4–10.4)
POTASSIUM SERPL-MCNC: 4.4 MMOL/L — SIGNIFICANT CHANGE UP (ref 3.5–5)
POTASSIUM SERPL-MCNC: 4.4 MMOL/L — SIGNIFICANT CHANGE UP (ref 3.5–5)
POTASSIUM SERPL-SCNC: 4.4 MMOL/L — SIGNIFICANT CHANGE UP (ref 3.5–5)
POTASSIUM SERPL-SCNC: 4.4 MMOL/L — SIGNIFICANT CHANGE UP (ref 3.5–5)
PROT SERPL-MCNC: 6.3 G/DL — SIGNIFICANT CHANGE UP (ref 6–8)
PROT SERPL-MCNC: 6.3 G/DL — SIGNIFICANT CHANGE UP (ref 6–8)
PROTHROM AB SERPL-ACNC: 11.1 SEC — SIGNIFICANT CHANGE UP (ref 9.95–12.87)
PROTHROM AB SERPL-ACNC: 11.1 SEC — SIGNIFICANT CHANGE UP (ref 9.95–12.87)
RBC # BLD: 4.32 M/UL — SIGNIFICANT CHANGE UP (ref 4.2–5.4)
RBC # BLD: 4.32 M/UL — SIGNIFICANT CHANGE UP (ref 4.2–5.4)
RBC # FLD: 15.3 % — HIGH (ref 11.5–14.5)
RBC # FLD: 15.3 % — HIGH (ref 11.5–14.5)
SODIUM SERPL-SCNC: 142 MMOL/L — SIGNIFICANT CHANGE UP (ref 135–146)
SODIUM SERPL-SCNC: 142 MMOL/L — SIGNIFICANT CHANGE UP (ref 135–146)
TROPONIN T SERPL-MCNC: <0.01 NG/ML — SIGNIFICANT CHANGE UP
TROPONIN T SERPL-MCNC: <0.01 NG/ML — SIGNIFICANT CHANGE UP
WBC # BLD: 8.01 K/UL — SIGNIFICANT CHANGE UP (ref 4.8–10.8)
WBC # BLD: 8.01 K/UL — SIGNIFICANT CHANGE UP (ref 4.8–10.8)
WBC # FLD AUTO: 8.01 K/UL — SIGNIFICANT CHANGE UP (ref 4.8–10.8)
WBC # FLD AUTO: 8.01 K/UL — SIGNIFICANT CHANGE UP (ref 4.8–10.8)

## 2023-12-02 PROCEDURE — 71045 X-RAY EXAM CHEST 1 VIEW: CPT

## 2023-12-02 PROCEDURE — 36415 COLL VENOUS BLD VENIPUNCTURE: CPT

## 2023-12-02 PROCEDURE — 71045 X-RAY EXAM CHEST 1 VIEW: CPT | Mod: 26

## 2023-12-02 PROCEDURE — 93010 ELECTROCARDIOGRAM REPORT: CPT

## 2023-12-02 PROCEDURE — 71275 CT ANGIOGRAPHY CHEST: CPT | Mod: 26,MA

## 2023-12-02 PROCEDURE — 85610 PROTHROMBIN TIME: CPT

## 2023-12-02 PROCEDURE — 99285 EMERGENCY DEPT VISIT HI MDM: CPT | Mod: 25

## 2023-12-02 PROCEDURE — 80053 COMPREHEN METABOLIC PANEL: CPT

## 2023-12-02 PROCEDURE — 85730 THROMBOPLASTIN TIME PARTIAL: CPT

## 2023-12-02 PROCEDURE — 85025 COMPLETE CBC W/AUTO DIFF WBC: CPT

## 2023-12-02 PROCEDURE — 99285 EMERGENCY DEPT VISIT HI MDM: CPT

## 2023-12-02 PROCEDURE — 93970 EXTREMITY STUDY: CPT | Mod: 26

## 2023-12-02 PROCEDURE — 84484 ASSAY OF TROPONIN QUANT: CPT

## 2023-12-02 PROCEDURE — 71275 CT ANGIOGRAPHY CHEST: CPT | Mod: MA

## 2023-12-02 PROCEDURE — 93970 EXTREMITY STUDY: CPT

## 2023-12-02 PROCEDURE — 93005 ELECTROCARDIOGRAM TRACING: CPT

## 2023-12-02 PROCEDURE — 83880 ASSAY OF NATRIURETIC PEPTIDE: CPT

## 2023-12-02 PROCEDURE — 83735 ASSAY OF MAGNESIUM: CPT

## 2023-12-02 RX ORDER — APIXABAN 2.5 MG/1
2 TABLET, FILM COATED ORAL
Qty: 56 | Refills: 0
Start: 2023-12-02 | End: 2023-12-15

## 2023-12-02 NOTE — ED PROVIDER NOTE - NSFOLLOWUPINSTRUCTIONS_ED_ALL_ED_FT
Medications were sent to your pharmacy:   Eliquis - take two 5mg tablets every 12 hours for 1 week, and then take one 5mg tablet every 12 hours after the first week of therapy.     Follow-up with Dr. Beckwith in 1 week.      Our Emergency Department Referral Coordinators will be reaching out to you in the next 24-48 hours from 9:00am to 5:00pm with a follow up appointment. Please expect a phone call from the hospital in that time frame. If you do not receive a call or if you have any questions or concerns, you can reach them at   [phone].      Deep Vein Thrombosis    A deep vein thrombosis (DVT) is a blood clot (thrombus) that usually occurs in a deep, larger vein of the lower leg or the pelvis, or in an upper extremity such as the arm. These are dangerous and can lead to serious and even life-threatening complications if the clot travels to the lungs. Symptoms include swelling of the arm or leg, warmth and redness of the arm or leg, and pain. Treatment may include taking a blood thinning medication; make sure to take anything prescribed as instructed.    SEEK IMMEDIATE MEDICAL CARE IF YOU HAVE ANY OF THE FOLLOWING SYMPTOMS: shortness of breath, chest pain, rapid or irregular heartbeat, lightheadedness/dizziness, coughing up blood, or any bleeding in your vomit, stool, or urine. These symptoms may represent a serious problem that is an emergency. Do not wait to see if the symptoms will go away. Call 911 and do not drive yourself to the hospital. Medications were sent to your pharmacy:   Eliquis - take two 5mg tablets every 12 hours for 1 week, and then take one 5mg tablet every 12 hours after the first week of therapy.     Follow-up with Dr. Beckwith in 1 week.      Our Emergency Department Referral Coordinators will be reaching out to you in the next 24-48 hours from 9:00am to 5:00pm with a follow up appointment. Please expect a phone call from the hospital in that time frame. If you do not receive a call or if you have any questions or concerns, you can reach them at   [phone].      Deep Vein Thrombosis    A deep vein thrombosis (DVT) is a blood clot (thrombus) that usually occurs in a deep, larger vein of the lower leg or the pelvis, or in an upper extremity such as the arm. These are dangerous and can lead to serious and even life-threatening complications if the clot travels to the lungs. Symptoms include swelling of the arm or leg, warmth and redness of the arm or leg, and pain. Treatment may include taking a blood thinning medication; make sure to take anything prescribed as instructed.    SEEK IMMEDIATE MEDICAL CARE IF YOU HAVE ANY OF THE FOLLOWING SYMPTOMS: shortness of breath, chest pain, rapid or irregular heartbeat, lightheadedness/dizziness, coughing up blood, or any bleeding in your vomit, stool, or urine. These symptoms may represent a serious problem that is an emergency. Do not wait to see if the symptoms will go away. Call 911 and do not drive yourself to the hospital. Medications were sent to your pharmacy:   Eliquis - take two 5mg tablets every 12 hours for 1 week, and then take one 5mg tablet every 12 hours after the first week of therapy.     Follow-up with Dr. Beckwith in 1 week.      Our Emergency Department Referral Coordinators will be reaching out to you in the next 24-48 hours from 9:00am to 5:00pm with a follow up appointment. Please expect a phone call from the hospital in that time frame. If you do not receive a call or if you have any questions or concerns, you can reach them at   [phone].      Deep Vein Thrombosis    A deep vein thrombosis (DVT) is a blood clot (thrombus) that usually occurs in a deep, larger vein of the lower leg or the pelvis, or in an upper extremity such as the arm. These are dangerous and can lead to serious and even life-threatening complications if the clot travels to the lungs. Symptoms include swelling of the arm or leg, warmth and redness of the arm or leg, and pain. Treatment may include taking a blood thinning medication; make sure to take anything prescribed as instructed.    SEEK IMMEDIATE MEDICAL CARE IF YOU HAVE ANY OF THE FOLLOWING SYMPTOMS: shortness of breath, chest pain, rapid or irregular heartbeat, lightheadedness/dizziness, coughing up blood, or any bleeding in your vomit, stool, or urine. These symptoms may represent a serious problem that is an emergency. Do not wait to see if the symptoms will go away. Call 911 and do not drive yourself to the hospital.

## 2023-12-02 NOTE — ED PROVIDER NOTE - OBJECTIVE STATEMENT
78-year-old female past medical history hypertension, hyperlipidemia, lower extremity edema on furosemide, rib fracture presents from [address] [place] [address] for rule out lower extremity DVT.  Endorses left leg pain since November 16 from the knee down.  Patient reports on September 12 she had a fall with multiple rib fractures and subsequent prolonged bedrest.  Endorses increased shortness of breath compared to baseline for the past month, worse with exertion.  Denies fever/chills, recent illness, chest pain, diaphoresis, cough, abdominal pain, N/V/D, change in bowel/bladder habits, change in p.o. intake, melena/medic easier, dysuria/frequency/urgency/hematuria, lightheadedness, weakness, numbness/tingling. 78-year-old female past medical history hypertension, hyperlipidemia, lower extremity edema on furosemide, rib fracture presents from [address] [place] [address] for rule out lower extremity DVT.  Endorses left leg pain since November 16 from the knee down.  Patient reports on September 12 she had a fall with multiple rib fractures and subsequent prolonged bedrest.  Endorses increased shortness of breath compared to baseline for the past month, worse with exertion.  Denies fever/chills, recent illness, chest pain, diaphoresis, cough, abdominal pain, N/V/D, change in bowel/bladder habits, change in p.o. intake, melena/medic easier, dysuria/frequency/urgency/hematuria, lightheadedness, weakness, numbness/tingling. 78-year-old female past medical history hypertension, hyperlipidemia, lower extremity edema on furosemide, rib fracture presents from [address] [place] [address] for rule out lower extremity DVT.  Endorses left leg pain since November 16 from the knee down.  Patient reports on September 12 she had a fall with multiple rib fractures and subsequent prolonged bedrest.  Endorses increased shortness of breath compared to baseline for the past month, worse with exertion.  Denies fever/chills, recent illness, chest pain, diaphoresis, cough, abdominal pain, N/V/D, change in bowel/bladder habits, change in p.o. intake, melena/medic easier, dysuria/frequency/urgency/hematuria, lightheadedness, weakness, numbness/tingling.

## 2023-12-02 NOTE — ED PROVIDER NOTE - PROVIDER TOKENS
PROVIDER:[TOKEN:[[id number]],FOLLOWUP:[7-10 Days]] PROVIDER:[TOKEN:[[id number]],FOLLOWUP:[7-10 Days]] PROVIDER:[TOKEN:[[id number]],FOLLOWUP:[7-10 Days]]

## 2023-12-02 NOTE — CONSULT NOTE ADULT - SUBJECTIVE AND OBJECTIVE BOX
VASCULAR SURGERY CONSULT NOTE      HPI:  78-year-old female past medical history hypertension, hyperlipidemia, lower extremity edema on furosemide, rib fracture presents from [address] [place] [address] for rule out lower extremity DVT.  Endorses left leg pain since November 16 from the knee down.  Patient reports on September 12 she had a fall with multiple rib fractures and subsequent prolonged bedrest.  Endorses increased shortness of breath compared to baseline for the past month, worse with exertion.  Denies fever/chills, recent illness, chest pain, diaphoresis, cough, abdominal pain, N/V/D, change in bowel/bladder habits, change in p.o. intake, melena/medic easier, dysuria/frequency/urgency/hematuria, lightheadedness, weakness, numbness/tingling. Patient also denies any personal medical history of blood clots, cancer.         PAST MEDICAL & SURGICAL HISTORY:  HTN (hypertension)  OP (osteoporosis)  High cholesterol  Depression  S/P cholecystectomy    penicillins (Swelling)    Home Medications:  hydroCHLOROthiazide:  (06 Dec 2021 09:04)  simvastatin:  (06 Dec 2021 09:04)  Wellbutrin:  (06 Dec 2021 09:04)    No permtinent family history of PVD    REVIEW OF SYSTEMS:  GENERAL:                                         negative  SKIN:                                                 negative  OPTHALMOLOGIC:                          negative  ENMT:                                               negative  RESPIRATORY AND THORAX:        negative  CARDIOVASCULAR:                         negative  GASTROINTESTINAL:                       negative  NEPHROLOGY:                                  negative  MUSCULOSKELETAL:                       B/L LE swelling  NEUROLOGIC:                                   negative  PSYCHIATRIC:                                    negative  HEMATOLOGY/LYMPHATICS:         negative  ENDOCRINE:                                     negative  ALLERGIC/IMMUNOLOGIC:            negative    12 point ROS otherwise normal except as stated in HPI    PHYSICAL EXAM  Vital Signs Last 24 Hrs  T(C): 37.1 (02 Dec 2023 15:27), Max: 37.1 (02 Dec 2023 15:27)  T(F): 98.8 (02 Dec 2023 15:27), Max: 98.8 (02 Dec 2023 15:27)  HR: 78 (02 Dec 2023 15:27) (78 - 78)  BP: 141/63 (02 Dec 2023 15:27) (141/63 - 141/63)  BP(mean): --  RR: 18 (02 Dec 2023 15:27) (18 - 18)  SpO2: 98% (02 Dec 2023 15:27) (98% - 98%)    Parameters below as of 02 Dec 2023 15:27  Patient On (Oxygen Delivery Method): room air        Appearance: Normal	  HEENT:   Normal oral mucosa, PERRL, EOMI	  Neck: Supple, - JVD; Carotid Bruit   Cardiovascular: Regular rate   Respiratory: Equal chest rise bilaterally  Gastrointestinal:  Soft, Non-tender, positive BS	  Skin: No rashes, No ecchymoses, No cyanosis  Extremities: Normal range of motion, No clubbing, cyanosis, B/L LE edema L>R  Vascular: Peripheral pulses palpable 2+ bilaterally  Neurologic: Non-focal  Psychiatry: A & O x 3, Mood & affect appropriate      PULSES:    Dorsal Pedal: palpable b/l  Posterior Tibial: palpable b/l       MEDICATIONS:   MEDICATIONS  (STANDING):    MEDICATIONS  (PRN):      LAB/STUDIES:                        11.3   8.01  )-----------( 278      ( 02 Dec 2023 17:12 )             36.4     12-02    142  |  104  |  21<H>  ----------------------------<  93  4.4   |  24  |  0.8    Ca    10.3      02 Dec 2023 17:12  Mg     2.1     12-02    TPro  6.3  /  Alb  3.6  /  TBili  0.2  /  DBili  x   /  AST  28  /  ALT  24  /  AlkPhos  106  12-02    PT/INR - ( 02 Dec 2023 17:12 )   PT: 11.10 sec;   INR: 0.97 ratio         PTT - ( 02 Dec 2023 17:12 )  PTT:26.7 sec  LIVER FUNCTIONS - ( 02 Dec 2023 17:12 )  Alb: 3.6 g/dL / Pro: 6.3 g/dL / ALK PHOS: 106 U/L / ALT: 24 U/L / AST: 28 U/L / GGT: x             Urinalysis Basic - ( 02 Dec 2023 17:12 )    Color: x / Appearance: x / SG: x / pH: x  Gluc: 93 mg/dL / Ketone: x  / Bili: x / Urobili: x   Blood: x / Protein: x / Nitrite: x   Leuk Esterase: x / RBC: x / WBC x   Sq Epi: x / Non Sq Epi: x / Bacteria: x      CARDIAC MARKERS ( 02 Dec 2023 17:12 )  x     / <0.01 ng/mL / x     / x     / x                    IMAGING:        < from: CT Angio Chest PE Protocol w/ IV Cont (12.02.23 @ 18:05) >  No pulmonary embolus seen. No CT evidence of acute right heart strain.    < end of copied text >   VASCULAR SURGERY CONSULT NOTE      HPI:  78-year-old female past medical history hypertension, hyperlipidemia, lower extremity edema on furosemide, rib fracture presents from [address] [place] [address] for rule out lower extremity DVT.  Endorses left leg pain since November 16 from the knee down.  Patient reports on September 12 she had a fall with multiple rib fractures and subsequent prolonged bedrest.  Endorses increased shortness of breath compared to baseline for the past month, worse with exertion.  Denies fever/chills, recent illness, chest pain, diaphoresis, cough, abdominal pain, N/V/D, change in bowel/bladder habits, change in p.o. intake, melena/medic easier, dysuria/frequency/urgency/hematuria, lightheadedness, weakness, numbness/tingling. Patient also denies any personal medical history of blood clots, cancer.         PAST MEDICAL & SURGICAL HISTORY:  HTN (hypertension)  OP (osteoporosis)  High cholesterol  Depression  S/P cholecystectomy    penicillins (Swelling)    Home Medications:  hydroCHLOROthiazide:  (06 Dec 2021 09:04)  simvastatin:  (06 Dec 2021 09:04)  Wellbutrin:  (06 Dec 2021 09:04)    No permtinent family history of PVD    REVIEW OF SYSTEMS:  GENERAL:                                         negative  SKIN:                                                 negative  OPTHALMOLOGIC:                          negative  ENMT:                                               negative  RESPIRATORY AND THORAX:        negative  CARDIOVASCULAR:                         negative  GASTROINTESTINAL:                       negative  NEPHROLOGY:                                  negative  MUSCULOSKELETAL:                       B/L LE swelling  NEUROLOGIC:                                   negative  PSYCHIATRIC:                                    negative  HEMATOLOGY/LYMPHATICS:         negative  ENDOCRINE:                                     negative  ALLERGIC/IMMUNOLOGIC:            negative    12 point ROS otherwise normal except as stated in HPI    PHYSICAL EXAM  Vital Signs Last 24 Hrs  T(C): 37.1 (02 Dec 2023 15:27), Max: 37.1 (02 Dec 2023 15:27)  T(F): 98.8 (02 Dec 2023 15:27), Max: 98.8 (02 Dec 2023 15:27)  HR: 78 (02 Dec 2023 15:27) (78 - 78)  BP: 141/63 (02 Dec 2023 15:27) (141/63 - 141/63)  BP(mean): --  RR: 18 (02 Dec 2023 15:27) (18 - 18)  SpO2: 98% (02 Dec 2023 15:27) (98% - 98%)    Parameters below as of 02 Dec 2023 15:27  Patient On (Oxygen Delivery Method): room air        Appearance: Normal	  HEENT:   Normal oral mucosa, PERRL, EOMI	  Neck: Supple, - JVD; Carotid Bruit   Cardiovascular: Regular rate   Respiratory: Equal chest rise bilaterally  Gastrointestinal:  Soft, Non-tender, positive BS	  Skin: No rashes, No ecchymoses, No cyanosis  Extremities: Normal range of motion, No clubbing, cyanosis, B/L LE edema L>R  Vascular: Peripheral pulses palpable 2+ bilaterally  Neurologic: Non-focal  Psychiatry: A & O x 3, Mood & affect appropriate      PULSES:    Dorsal Pedal: palpable b/l  Posterior Tibial: palpable b/l       MEDICATIONS:   MEDICATIONS  (STANDING):    MEDICATIONS  (PRN):      LAB/STUDIES:                        11.3   8.01  )-----------( 278      ( 02 Dec 2023 17:12 )             36.4     12-02    142  |  104  |  21<H>  ----------------------------<  93  4.4   |  24  |  0.8    Ca    10.3      02 Dec 2023 17:12  Mg     2.1     12-02    TPro  6.3  /  Alb  3.6  /  TBili  0.2  /  DBili  x   /  AST  28  /  ALT  24  /  AlkPhos  106  12-02    PT/INR - ( 02 Dec 2023 17:12 )   PT: 11.10 sec;   INR: 0.97 ratio         PTT - ( 02 Dec 2023 17:12 )  PTT:26.7 sec  LIVER FUNCTIONS - ( 02 Dec 2023 17:12 )  Alb: 3.6 g/dL / Pro: 6.3 g/dL / ALK PHOS: 106 U/L / ALT: 24 U/L / AST: 28 U/L / GGT: x             Urinalysis Basic - ( 02 Dec 2023 17:12 )    Color: x / Appearance: x / SG: x / pH: x  Gluc: 93 mg/dL / Ketone: x  / Bili: x / Urobili: x   Blood: x / Protein: x / Nitrite: x   Leuk Esterase: x / RBC: x / WBC x   Sq Epi: x / Non Sq Epi: x / Bacteria: x      CARDIAC MARKERS ( 02 Dec 2023 17:12 )  x     / <0.01 ng/mL / x     / x     / x                    IMAGING:        < from: CT Angio Chest PE Protocol w/ IV Cont (12.02.23 @ 18:05) >  No pulmonary embolus seen. No CT evidence of acute right heart strain.    < end of copied text >   VASCULAR SURGERY CONSULT NOTE      HPI:  78-year-old female past medical history hypertension, hyperlipidemia, lower extremity edema on furosemide, rib fracture presents from [address] [place] [address] for rule out lower extremity DVT.  Endorses left leg pain since November 16 from the knee down.  Patient reports on September 12 she had a fall with multiple rib fractures and subsequent prolonged bedrest.  Endorses increased shortness of breath compared to baseline for the past month, worse with exertion.  Denies fever/chills, recent illness, chest pain, diaphoresis, cough, abdominal pain, N/V/D, change in bowel/bladder habits, change in p.o. intake, melena/medic easier, dysuria/frequency/urgency/hematuria, lightheadedness, weakness, numbness/tingling. Patient also denies any personal medical history of blood clots, cancer.         PAST MEDICAL & SURGICAL HISTORY:  HTN (hypertension)  OP (osteoporosis)  High cholesterol  Depression  S/P cholecystectomy    penicillins (Swelling)    Home Medications:  hydroCHLOROthiazide:  (06 Dec 2021 09:04)  simvastatin:  (06 Dec 2021 09:04)  Wellbutrin:  (06 Dec 2021 09:04)    No permtinent family history of PVD    REVIEW OF SYSTEMS:  GENERAL:                                         negative  SKIN:                                                 negative  OPTHALMOLOGIC:                          negative  ENMT:                                               negative  RESPIRATORY AND THORAX:        negative  CARDIOVASCULAR:                         negative  GASTROINTESTINAL:                       negative  NEPHROLOGY:                                  negative  MUSCULOSKELETAL:                       B/L LE swelling  NEUROLOGIC:                                   negative  PSYCHIATRIC:                                    negative  HEMATOLOGY/LYMPHATICS:         negative  ENDOCRINE:                                     negative  ALLERGIC/IMMUNOLOGIC:            negative    12 point ROS otherwise normal except as stated in HPI    PHYSICAL EXAM  Vital Signs Last 24 Hrs  T(C): 37.1 (02 Dec 2023 15:27), Max: 37.1 (02 Dec 2023 15:27)  T(F): 98.8 (02 Dec 2023 15:27), Max: 98.8 (02 Dec 2023 15:27)  HR: 78 (02 Dec 2023 15:27) (78 - 78)  BP: 141/63 (02 Dec 2023 15:27) (141/63 - 141/63)  BP(mean): --  RR: 18 (02 Dec 2023 15:27) (18 - 18)  SpO2: 98% (02 Dec 2023 15:27) (98% - 98%)    Parameters below as of 02 Dec 2023 15:27  Patient On (Oxygen Delivery Method): room air        Appearance: Normal	  HEENT:   Normal oral mucosa, PERRL, EOMI	  Neck: Supple, - JVD; Carotid Bruit   Cardiovascular: Regular rate   Respiratory: Equal chest rise bilaterally  Gastrointestinal:  Soft, Non-tender, positive BS	  Skin: No rashes, No ecchymoses, No cyanosis  Extremities: Normal range of motion, No clubbing, cyanosis, B/L LE edema L>R  Vascular: Peripheral pulses palpable 2+ bilaterally  Neurologic: Non-focal  Psychiatry: A & O x 3, Mood & affect appropriate      PULSES:    Dorsal Pedal: palpable b/l  Posterior Tibial: palpable b/l       MEDICATIONS:   MEDICATIONS  (STANDING):    MEDICATIONS  (PRN):      LAB/STUDIES:                        11.3   8.01  )-----------( 278      ( 02 Dec 2023 17:12 )             36.4     12-02    142  |  104  |  21<H>  ----------------------------<  93  4.4   |  24  |  0.8    Ca    10.3      02 Dec 2023 17:12  Mg     2.1     12-02    TPro  6.3  /  Alb  3.6  /  TBili  0.2  /  DBili  x   /  AST  28  /  ALT  24  /  AlkPhos  106  12-02    PT/INR - ( 02 Dec 2023 17:12 )   PT: 11.10 sec;   INR: 0.97 ratio         PTT - ( 02 Dec 2023 17:12 )  PTT:26.7 sec  LIVER FUNCTIONS - ( 02 Dec 2023 17:12 )  Alb: 3.6 g/dL / Pro: 6.3 g/dL / ALK PHOS: 106 U/L / ALT: 24 U/L / AST: 28 U/L / GGT: x             Urinalysis Basic - ( 02 Dec 2023 17:12 )    Color: x / Appearance: x / SG: x / pH: x  Gluc: 93 mg/dL / Ketone: x  / Bili: x / Urobili: x   Blood: x / Protein: x / Nitrite: x   Leuk Esterase: x / RBC: x / WBC x   Sq Epi: x / Non Sq Epi: x / Bacteria: x      CARDIAC MARKERS ( 02 Dec 2023 17:12 )  x     / <0.01 ng/mL / x     / x     / x                    IMAGING:        < from: CT Angio Chest PE Protocol w/ IV Cont (12.02.23 @ 18:05) >  No pulmonary embolus seen. No CT evidence of acute right heart strain.    < end of copied text >

## 2023-12-02 NOTE — ED PROVIDER NOTE - PATIENT PORTAL LINK FT
You can access the FollowMyHealth Patient Portal offered by [facility] by registering at the following website: [email or web address]. By joining [employer]’s FollowMyHealth portal, you will also be able to view your health information using other applications (apps) compatible with our system. You can access the FollowMyHealth Patient Portal offered by [facility] by registering at the following website: [email or web address]. By joining [employer]’s FollowMyHealth portal, you will also be able to view your health information using other applications (apps) compatible with our system. You can access the FollowMyHealth Patient Portal offered by [facility] by registering at the following website: [email or web address]. By joining [employer]’s FollowMyHealth portal, you will also be able to view your health information using other applications (apps) compatible with our system.

## 2023-12-02 NOTE — CONSULT NOTE ADULT - ASSESSMENT
ASSESSMENT:  78-year-old female past medical history hypertension, hyperlipidemia, lower extremity edema on furosemide, rib fracture presents from [address] [place] [address] for rule out lower extremity DVT.  Endorses left leg pain since November 16 from the knee down.  Patient reports on September 12 she had a fall with multiple rib fractures and subsequent prolonged bedrest.  Endorses increased shortness of breath compared to baseline for the past month, worse with exertion.  Denies fever/chills, recent illness, chest pain, diaphoresis, cough, abdominal pain, N/V/D, change in bowel/bladder habits, change in p.o. intake, melena/medic easier, dysuria/frequency/urgency/hematuria, lightheadedness, weakness, numbness/tingling. Patient also denies any personal medical history of blood clots, cancer.     Vascular surgery consulted for LLE DVT on b/l LE venous duplex    PLAN:   - 3 months therapeutic anticoagulation  - Recommend Eliquis 10mg BID loading dose, followed by 5mg BID for a total of 3 months  - Educated patient about risk of bleeding on anticoagulation medication, need to return to the ED if patient has head strike  - Patient can follow up outpatient w/ Dr. Vasquez Beckwith in 2 weeks    - Patient seen/examined or Plan Discussed with Fellow, Dr. Ellison  - Plan to be discussed with Attending, Dr. Beckwith ASSESSMENT:  78-year-old female past medical history hypertension, hyperlipidemia, lower extremity edema on furosemide, rib fracture presents from [address] [place] [address] for rule out lower extremity DVT.  Endorses left leg pain since November 16 from the knee down.  Patient reports on September 12 she had a fall with multiple rib fractures and subsequent prolonged bedrest.  Endorses increased shortness of breath compared to baseline for the past month, worse with exertion.  Denies fever/chills, recent illness, chest pain, diaphoresis, cough, abdominal pain, N/V/D, change in bowel/bladder habits, change in p.o. intake, melena/medic easier, dysuria/frequency/urgency/hematuria, lightheadedness, weakness, numbness/tingling. Patient also denies any personal medical history of blood clots, cancer.     Vascular surgery consulted for LLE DVT on b/l LE venous duplex    PLAN:   - 3 months therapeutic anticoagulation  - Recommend Eliquis 10mg BID loading dose, followed by 5mg BID for a total of 3 months  - Educated patient about risk of bleeding on anticoagulation medication, need to return to the ED if patient has head strike  - Patient can follow up outpatient w/ Dr. Vasquez Beckwith in 2 weeks    - Patient seen/examined or Plan Discussed with Fellow, Dr. Ellison  - Plan to be discussed with Attending, Dr. Beckwith ASSESSMENT:  78-year-old female past medical history hypertension, hyperlipidemia, lower extremity edema on furosemide, rib fracture presents from [address] [place] [address] for rule out lower extremity DVT.  Endorses left leg pain since November 16 from the knee down.  Patient reports on September 12 she had a fall with multiple rib fractures and subsequent prolonged bedrest.  Endorses increased shortness of breath compared to baseline for the past month, worse with exertion.  Denies fever/chills, recent illness, chest pain, diaphoresis, cough, abdominal pain, N/V/D, change in bowel/bladder habits, change in p.o. intake, melena/medic easier, dysuria/frequency/urgency/hematuria, lightheadedness, weakness, numbness/tingling. Patient also denies any personal medical history of blood clots, cancer.     Vascular surgery consulted for LLE DVT on b/l LE venous duplex    PLAN:   - 3 months therapeutic anticoagulation  - Recommend Eliquis 10mg BID loading dose, followed by 5mg BID for a total of 3 months  - Educated patient about risk of bleeding on anticoagulation medication, need to return to the ED if patient has head strike  - Patient can follow up outpatient w/ Dr. Vasquez Beckwith in 2 weeks    - Patient seen/examined or Plan Discussed with Fellow, Dr. Ellison  - Plan to be discussed with Attending, Dr. Beckwith

## 2023-12-02 NOTE — ED ADULT TRIAGE NOTE - ACCOMPANIED BY
Amber Parsons is a 78year old female.   Patient presents with:  Ear Wax: bilateral ear cleaning       HISTORY OF PRESENT ILLNESS    Patient presents for cerumen removal. No other complaints or concerns at this time    Social History    Socioeconomic His Immediate family member left foot 2010    NG: Hallux valgus left, pes valgo planus/pain - 1/26/2010; Mangement:  Dispensed orthoses - 2/12/2010 - [facility]    • High blood pressure    • High cholesterol    • History of chicken pox    • History of loop recorder 6/7/2018   • His REVIEW OF SYSTEMS    System Neg/Pos Details   Constitutional Negative Fatigue, fever and weight loss. ENMT Negative Drooling. Eyes Negative Blurred vision and vision changes. Respiratory Negative Dyspnea and wheezing.    Cardio Negative Chest pa (XARELTO) 20 MG Oral Tab, Take 20 mg by mouth daily. , Disp: , Rfl:   •  HYDROcodone-acetaminophen 7.5-325 MG Oral Tab, as needed, Disp: , Rfl:   •  DULoxetine HCl (CYMBALTA) 60 MG Oral Cap DR Particles, Take 1 capsule (60 mg total) by mouth daily.  take 1 c

## 2023-12-02 NOTE — ED PROVIDER NOTE - PHYSICAL EXAMINATION
Physical Exam    Vital Signs: I have reviewed the initial vital signs.  Constitutional: appears stated age, no acute distress  Eyes: Sclera clear, EOMI.  Cardiovascular: S1 and S2, regular rate, regular rhythm, well-perfused extremities, radial pulses equal and 2+, pedal pulses 2+ and equal  Respiratory: unlabored respiratory effort, clear to auscultation bilaterally no wheezing, rales, or rhonchi  Gastrointestinal:  abdomen soft, non-tender, no pulsatile mass, bowel sounds within normal limits  Musculoskeletal: supple neck, mild nonpitting bilateral lower extremity edema  Integumentary: warm, dry, no rash  Neurologic: awake, alert, oriented x3, extremities’ motor and sensory functions grossly intact

## 2023-12-02 NOTE — ED PROVIDER NOTE - PROGRESS NOTE DETAILS
AY: Preliminary read of patient's lower extremity duplex is + for DVT to left gastrocnemius. Vascular surgery consulted. AY: Per vascular surgery resident, patient can be discharged with 10mg eliquis BID x 1 week followed by 5mg eliquis BID x 2 weeks and follow-up with Dr. Beckwith. The patient was given detailed return precautions and advised to return to the emergency department if any new symptoms developed, symptoms worsened or for any concerns. The patient was offered the opportunity to ask questions and verbalized that they understand the diagnosis and discharge instructions.

## 2023-12-02 NOTE — ED PROVIDER NOTE - CARE PROVIDER_API CALL
Vasquez Beckwith  Vascular Surgery  [address] [place] [address]  Phone: [phone]  Fax: [phone]  Follow Up Time: 7-10 Days   Vasquez Beckwith  Vascular Surgery  [address] [place] [address]  Phone: [phone]  Fax: [phone]  Follow Up Time: 7-10 Days   Vasquez Beckwith  Vascular Surgery  [address] [place] [address]  Phone: [phone]  Fax: [phone]  Follow Up Time: 7-10 Days

## 2023-12-02 NOTE — ED PROVIDER NOTE - DIFFERENTIAL DIAGNOSIS
L leg pain, dyspnea, r/o DVT/PE, and also consider ACS vs PE vs dissection vs tamponade vs esophageal rupture vs pneumothorax vs pneumonia vs fluid overload Differential Diagnosis

## 2023-12-02 NOTE — ED PROVIDER NOTE - CLINICAL SUMMARY MEDICAL DECISION MAKING FREE TEXT BOX
Patient presented with atraumatic left leg pain as documented associated with shortness of breath that has been worsening over the past month.  Otherwise afebrile, hemodynamically stable, lungs clear, normal O2 saturation on room air.  EKG obtained and nonischemic.  Obtained labs which were grossly unremarkable including no significant leukocytosis, anemia, signs of dehydration/MAC, transaminitis or significant electrolyte abnormalities.  Troponin negative.  DVT study obtained and showed positive DVT in the left gastrocnemius vein but CTA chest negative for PE.  Chest xray negative for pneumothorax, pneumonia, widened mediastinum, evidence of rib fractures, enlarged cardiac silhouette or any other emergent pathologies.  Consulted vascular surgery for DVT who evaluated the patient in the ED.  Recommending p.o. Chinmay, discharged home with outpatient vascular follow-up.  Patient and daughter at bedside agreeable with plan. Agrees to return to ED for any new or worsening symptoms.

## 2024-07-26 ENCOUNTER — NON-APPOINTMENT (OUTPATIENT)
Age: 79
End: 2024-07-26

## 2024-07-27 ENCOUNTER — EMERGENCY (EMERGENCY)
Facility: HOSPITAL | Age: 79
LOS: 0 days | Discharge: HOME | End: 2024-07-27
Attending: EMERGENCY MEDICINE
Payer: MEDICARE

## 2024-07-27 VITALS
TEMPERATURE: 98 F | WEIGHT: 179.9 LBS | RESPIRATION RATE: 18 BRPM | OXYGEN SATURATION: 95 % | DIASTOLIC BLOOD PRESSURE: 97 MMHG | HEART RATE: 85 BPM | SYSTOLIC BLOOD PRESSURE: 176 MMHG

## 2024-07-27 DIAGNOSIS — I10 ESSENTIAL (PRIMARY) HYPERTENSION: ICD-10-CM

## 2024-07-27 DIAGNOSIS — Z88.0 ALLERGY STATUS TO PENICILLIN: ICD-10-CM

## 2024-07-27 DIAGNOSIS — E78.5 HYPERLIPIDEMIA, UNSPECIFIED: ICD-10-CM

## 2024-07-27 DIAGNOSIS — Z90.49 ACQUIRED ABSENCE OF OTHER SPECIFIED PARTS OF DIGESTIVE TRACT: Chronic | ICD-10-CM

## 2024-07-27 DIAGNOSIS — R29.810 FACIAL WEAKNESS: ICD-10-CM

## 2024-07-27 DIAGNOSIS — G51.0 BELL'S PALSY: ICD-10-CM

## 2024-07-27 PROCEDURE — 99284 EMERGENCY DEPT VISIT MOD MDM: CPT

## 2024-07-27 PROCEDURE — 99283 EMERGENCY DEPT VISIT LOW MDM: CPT

## 2024-07-27 RX ORDER — LANOLIN/MINERAL OIL/PETROLATUM
1 OINTMENT (GRAM) OPHTHALMIC (EYE)
Qty: 1 | Refills: 0
Start: 2024-07-27 | End: 2024-08-05

## 2024-07-27 RX ORDER — PREDNISONE 20 MG/1
3 TABLET ORAL
Qty: 15 | Refills: 0
Start: 2024-07-27 | End: 2024-07-31

## 2024-07-29 ENCOUNTER — OUTPATIENT (OUTPATIENT)
Dept: OUTPATIENT SERVICES | Facility: HOSPITAL | Age: 79
LOS: 1 days | End: 2024-07-29
Payer: MEDICARE

## 2024-07-29 DIAGNOSIS — Z90.49 ACQUIRED ABSENCE OF OTHER SPECIFIED PARTS OF DIGESTIVE TRACT: Chronic | ICD-10-CM

## 2024-07-29 DIAGNOSIS — Z12.31 ENCOUNTER FOR SCREENING MAMMOGRAM FOR MALIGNANT NEOPLASM OF BREAST: ICD-10-CM

## 2024-07-29 PROCEDURE — 77067 SCR MAMMO BI INCL CAD: CPT | Mod: 26

## 2024-07-29 PROCEDURE — 77063 BREAST TOMOSYNTHESIS BI: CPT | Mod: 26

## 2024-07-29 PROCEDURE — 77063 BREAST TOMOSYNTHESIS BI: CPT

## 2024-07-29 PROCEDURE — 77067 SCR MAMMO BI INCL CAD: CPT

## 2024-07-30 DIAGNOSIS — Z12.31 ENCOUNTER FOR SCREENING MAMMOGRAM FOR MALIGNANT NEOPLASM OF BREAST: ICD-10-CM

## 2024-12-13 ENCOUNTER — EMERGENCY (EMERGENCY)
Facility: HOSPITAL | Age: 79
LOS: 0 days | Discharge: HOME | End: 2024-12-14
Attending: STUDENT IN AN ORGANIZED HEALTH CARE EDUCATION/TRAINING PROGRAM
Payer: MEDICARE

## 2024-12-13 VITALS
RESPIRATION RATE: 18 BRPM | HEART RATE: 86 BPM | DIASTOLIC BLOOD PRESSURE: 77 MMHG | OXYGEN SATURATION: 95 % | HEIGHT: 60 IN | SYSTOLIC BLOOD PRESSURE: 130 MMHG | TEMPERATURE: 98 F | WEIGHT: 179.9 LBS

## 2024-12-13 VITALS
HEART RATE: 78 BPM | SYSTOLIC BLOOD PRESSURE: 114 MMHG | OXYGEN SATURATION: 95 % | DIASTOLIC BLOOD PRESSURE: 67 MMHG | RESPIRATION RATE: 18 BRPM | TEMPERATURE: 98 F

## 2024-12-13 DIAGNOSIS — Z90.49 ACQUIRED ABSENCE OF OTHER SPECIFIED PARTS OF DIGESTIVE TRACT: Chronic | ICD-10-CM

## 2024-12-13 LAB
ALBUMIN SERPL ELPH-MCNC: 3.5 G/DL — SIGNIFICANT CHANGE UP (ref 3.5–5.2)
ALP SERPL-CCNC: 92 U/L — SIGNIFICANT CHANGE UP (ref 30–115)
ALT FLD-CCNC: 21 U/L — SIGNIFICANT CHANGE UP (ref 0–41)
ANION GAP SERPL CALC-SCNC: 9 MMOL/L — SIGNIFICANT CHANGE UP (ref 7–14)
APTT BLD: 26.8 SEC — LOW (ref 27–39.2)
AST SERPL-CCNC: 23 U/L — SIGNIFICANT CHANGE UP (ref 0–41)
BASOPHILS # BLD AUTO: 0.04 K/UL — SIGNIFICANT CHANGE UP (ref 0–0.2)
BASOPHILS NFR BLD AUTO: 0.4 % — SIGNIFICANT CHANGE UP (ref 0–1)
BILIRUB SERPL-MCNC: 0.4 MG/DL — SIGNIFICANT CHANGE UP (ref 0.2–1.2)
BUN SERPL-MCNC: 35 MG/DL — HIGH (ref 10–20)
CALCIUM SERPL-MCNC: 9.9 MG/DL — SIGNIFICANT CHANGE UP (ref 8.4–10.4)
CHLORIDE SERPL-SCNC: 105 MMOL/L — SIGNIFICANT CHANGE UP (ref 98–110)
CO2 SERPL-SCNC: 26 MMOL/L — SIGNIFICANT CHANGE UP (ref 17–32)
CREAT SERPL-MCNC: 1.4 MG/DL — SIGNIFICANT CHANGE UP (ref 0.7–1.5)
EGFR: 38 ML/MIN/1.73M2 — LOW
EOSINOPHIL # BLD AUTO: 0.11 K/UL — SIGNIFICANT CHANGE UP (ref 0–0.7)
EOSINOPHIL NFR BLD AUTO: 1.1 % — SIGNIFICANT CHANGE UP (ref 0–8)
ETHANOL SERPL-MCNC: <10 MG/DL — SIGNIFICANT CHANGE UP
GLUCOSE SERPL-MCNC: 112 MG/DL — HIGH (ref 70–99)
HCT VFR BLD CALC: 34.3 % — LOW (ref 37–47)
HGB BLD-MCNC: 11 G/DL — LOW (ref 12–16)
IMM GRANULOCYTES NFR BLD AUTO: 0.9 % — HIGH (ref 0.1–0.3)
INR BLD: 1.12 RATIO — SIGNIFICANT CHANGE UP (ref 0.65–1.3)
LACTATE SERPL-SCNC: 0.8 MMOL/L — SIGNIFICANT CHANGE UP (ref 0.7–2)
LIDOCAIN IGE QN: 29 U/L — SIGNIFICANT CHANGE UP (ref 7–60)
LYMPHOCYTES # BLD AUTO: 1.01 K/UL — LOW (ref 1.2–3.4)
LYMPHOCYTES # BLD AUTO: 9.9 % — LOW (ref 20.5–51.1)
MCHC RBC-ENTMCNC: 27.2 PG — SIGNIFICANT CHANGE UP (ref 27–31)
MCHC RBC-ENTMCNC: 32.1 G/DL — SIGNIFICANT CHANGE UP (ref 32–37)
MCV RBC AUTO: 84.7 FL — SIGNIFICANT CHANGE UP (ref 81–99)
MONOCYTES # BLD AUTO: 0.99 K/UL — HIGH (ref 0.1–0.6)
MONOCYTES NFR BLD AUTO: 9.7 % — HIGH (ref 1.7–9.3)
NEUTROPHILS # BLD AUTO: 7.94 K/UL — HIGH (ref 1.4–6.5)
NEUTROPHILS NFR BLD AUTO: 78 % — HIGH (ref 42.2–75.2)
NRBC # BLD: 0 /100 WBCS — SIGNIFICANT CHANGE UP (ref 0–0)
PLATELET # BLD AUTO: 180 K/UL — SIGNIFICANT CHANGE UP (ref 130–400)
PMV BLD: 11 FL — HIGH (ref 7.4–10.4)
POTASSIUM SERPL-MCNC: 4.1 MMOL/L — SIGNIFICANT CHANGE UP (ref 3.5–5)
POTASSIUM SERPL-SCNC: 4.1 MMOL/L — SIGNIFICANT CHANGE UP (ref 3.5–5)
PROT SERPL-MCNC: 5.8 G/DL — LOW (ref 6–8)
PROTHROM AB SERPL-ACNC: 13.3 SEC — HIGH (ref 9.95–12.87)
RBC # BLD: 4.05 M/UL — LOW (ref 4.2–5.4)
RBC # FLD: 14.4 % — SIGNIFICANT CHANGE UP (ref 11.5–14.5)
SODIUM SERPL-SCNC: 140 MMOL/L — SIGNIFICANT CHANGE UP (ref 135–146)
WBC # BLD: 10.18 K/UL — SIGNIFICANT CHANGE UP (ref 4.8–10.8)
WBC # FLD AUTO: 10.18 K/UL — SIGNIFICANT CHANGE UP (ref 4.8–10.8)

## 2024-12-13 PROCEDURE — 73030 X-RAY EXAM OF SHOULDER: CPT | Mod: RT

## 2024-12-13 PROCEDURE — 99285 EMERGENCY DEPT VISIT HI MDM: CPT | Mod: 57

## 2024-12-13 PROCEDURE — 70450 CT HEAD/BRAIN W/O DYE: CPT | Mod: 26,MC

## 2024-12-13 PROCEDURE — 73090 X-RAY EXAM OF FOREARM: CPT | Mod: 26,RT

## 2024-12-13 PROCEDURE — 73130 X-RAY EXAM OF HAND: CPT | Mod: 26,RT

## 2024-12-13 PROCEDURE — 80307 DRUG TEST PRSMV CHEM ANLYZR: CPT

## 2024-12-13 PROCEDURE — 74177 CT ABD & PELVIS W/CONTRAST: CPT | Mod: MC

## 2024-12-13 PROCEDURE — 73130 X-RAY EXAM OF HAND: CPT | Mod: RT

## 2024-12-13 PROCEDURE — 73110 X-RAY EXAM OF WRIST: CPT | Mod: 26,RT

## 2024-12-13 PROCEDURE — 71260 CT THORAX DX C+: CPT | Mod: 26,MC

## 2024-12-13 PROCEDURE — 73060 X-RAY EXAM OF HUMERUS: CPT | Mod: 26,RT

## 2024-12-13 PROCEDURE — 73080 X-RAY EXAM OF ELBOW: CPT | Mod: 26,RT

## 2024-12-13 PROCEDURE — 71045 X-RAY EXAM CHEST 1 VIEW: CPT | Mod: 26

## 2024-12-13 PROCEDURE — 99284 EMERGENCY DEPT VISIT MOD MDM: CPT | Mod: 25

## 2024-12-13 PROCEDURE — 70450 CT HEAD/BRAIN W/O DYE: CPT | Mod: MC

## 2024-12-13 PROCEDURE — 29105 APPLICATION LONG ARM SPLINT: CPT | Mod: RT

## 2024-12-13 PROCEDURE — 85025 COMPLETE CBC W/AUTO DIFF WBC: CPT

## 2024-12-13 PROCEDURE — 73552 X-RAY EXAM OF FEMUR 2/>: CPT | Mod: 26,50

## 2024-12-13 PROCEDURE — 73552 X-RAY EXAM OF FEMUR 2/>: CPT | Mod: 50

## 2024-12-13 PROCEDURE — 36415 COLL VENOUS BLD VENIPUNCTURE: CPT

## 2024-12-13 PROCEDURE — 83690 ASSAY OF LIPASE: CPT

## 2024-12-13 PROCEDURE — 73502 X-RAY EXAM HIP UNI 2-3 VIEWS: CPT | Mod: RT

## 2024-12-13 PROCEDURE — 73080 X-RAY EXAM OF ELBOW: CPT | Mod: RT

## 2024-12-13 PROCEDURE — 85610 PROTHROMBIN TIME: CPT

## 2024-12-13 PROCEDURE — 83605 ASSAY OF LACTIC ACID: CPT

## 2024-12-13 PROCEDURE — 73110 X-RAY EXAM OF WRIST: CPT | Mod: RT

## 2024-12-13 PROCEDURE — 73060 X-RAY EXAM OF HUMERUS: CPT | Mod: RT

## 2024-12-13 PROCEDURE — 23600 CLTX PROX HUMRL FX W/O MNPJ: CPT | Mod: 54,RT

## 2024-12-13 PROCEDURE — 80053 COMPREHEN METABOLIC PANEL: CPT

## 2024-12-13 PROCEDURE — 85730 THROMBOPLASTIN TIME PARTIAL: CPT

## 2024-12-13 PROCEDURE — 73502 X-RAY EXAM HIP UNI 2-3 VIEWS: CPT | Mod: 26,RT

## 2024-12-13 PROCEDURE — 71260 CT THORAX DX C+: CPT | Mod: MC

## 2024-12-13 PROCEDURE — 72125 CT NECK SPINE W/O DYE: CPT | Mod: 26,MC

## 2024-12-13 PROCEDURE — 74177 CT ABD & PELVIS W/CONTRAST: CPT | Mod: 26,MC

## 2024-12-13 PROCEDURE — 73090 X-RAY EXAM OF FOREARM: CPT | Mod: RT

## 2024-12-13 PROCEDURE — 72125 CT NECK SPINE W/O DYE: CPT | Mod: MC

## 2024-12-13 PROCEDURE — 73030 X-RAY EXAM OF SHOULDER: CPT | Mod: 26,RT

## 2024-12-13 PROCEDURE — 71045 X-RAY EXAM CHEST 1 VIEW: CPT

## 2024-12-13 RX ORDER — ACETAMINOPHEN 500MG 500 MG/1
975 TABLET, COATED ORAL ONCE
Refills: 0 | Status: COMPLETED | OUTPATIENT
Start: 2024-12-13 | End: 2024-12-13

## 2024-12-13 RX ADMIN — ACETAMINOPHEN 500MG 975 MILLIGRAM(S): 500 TABLET, COATED ORAL at 19:44

## 2024-12-13 NOTE — ED PROVIDER NOTE - PHYSICAL EXAMINATION
GENERAL: Well-nourished, Well-developed. NAD.  HEAD: No visible or palpable bumps or hematomas. No ecchymosis behind ears B/L.  Eyes: PERRLA, EOMI.   Neck: Supple. No cervical midline TTP. No paravertebral TTP to traps. FROM  CVS: No reproducible chest wall tenderness. Normal S1,S2. No murmurs appreciated on auscultation   RESP: No use of accessory muscles. Chest rise symmetrical with good expansion. Lungs clear to auscultation B/L. No wheezing, rales, or rhonchi auscultated.  GI: Normal auscultation of bowel sounds in all 4 quadrants. Soft, Nontender, Nondistended. No guarding or rebound tenderness. No CVAT B/L.  MSK: (+)swelling and bruising to RUE area of humerus and to wrist and hand. (+)ttp and LROM to R hand/wrist and shoulder. FROM R elbow however does have pain with movement. remainder of  extremities w/o deformity or ttp. No midline spinal TTP. FROM of back with flexion and extension.  Skin: Warm, Dry. No rashes or lesions. Good cap refill < 2 sec B/L.  EXT: Radial and pedal pulses present B/L. No calf tenderness or swelling B/L. No palpable cords. No pedal edema B/L.  Neuro: AA&O x 3. Sensation grossly intact. Strength 5/5 B/L. Gait within normal limits.

## 2024-12-13 NOTE — ED PROVIDER NOTE - PATIENT PORTAL LINK FT
You can access the FollowMyHealth Patient Portal offered by [facility] by registering at the following website: [email or web address]. By joining [employer]’s FollowMyHealth portal, you will also be able to view your health information using other applications (apps) compatible with our system.

## 2024-12-13 NOTE — ED PROVIDER NOTE - CARE PROVIDER_API CALL
Win Nolan  Orthopaedic Surgery  [facility] [place] [address]  Phone: [phone]  Fax: [phone]  Follow Up Time: 1-3 Days    Fran Martinez  Orthopaedic Surgery  [address] [place] [id number]  Phone: [phone]  Fax: [phone]  Follow Up Time: 1-3 Days

## 2024-12-13 NOTE — ED PROVIDER NOTE - ATTENDING CONTRIBUTION TO CARE
see mdm for attending note Fluconazole Counseling:  Patient counseled regarding adverse effects of fluconazole including but not limited to headache, diarrhea, nausea, upset stomach, liver function test abnormalities, taste disturbance, and stomach pain.  There is a rare possibility of liver failure that can occur when taking fluconazole.  The patient understands that monitoring of LFTs and kidney function test may be required, especially at baseline. The patient verbalized understanding of the proper use and possible adverse effects of fluconazole.  All of the patient's questions and concerns were addressed.

## 2024-12-13 NOTE — ED PROVIDER NOTE - NSFOLLOWUPINSTRUCTIONS_ED_ALL_ED_FT
Our Emergency Department Referral Coordinators will be reaching out to you in the next 24-48 hours from 9:00am to 5:00pm to schedule a follow up appointment. Please expect a phone call from the hospital in that time frame. If you do not receive a call or if you have any questions or concerns, you can reach them at   [phone].        Fracture    A fracture is a break in one of your bones. This can occur from a variety of injuries, especially traumatic ones. Symptoms include pain, bruising, or swelling. Do not use the injured limb. If a fracture is in one of the bones below your waist, do not put weight on that limb unless instructed to do so by your healthcare provider. Crutches or a cane may have been provided. A splint or cast may have been applied by your health care provider. Make sure to keep it dry and follow up with an orthopedist as instructed.    SEEK IMMEDIATE MEDICAL CARE IF YOU HAVE ANY OF THE FOLLOWING SYMPTOMS: numbness, tingling, increasing pain, or weakness in any part of the injured limb.

## 2024-12-13 NOTE — ED ADULT TRIAGE NOTE - CHIEF COMPLAINT QUOTE
pt c/o right arm pain, bruising and swelling worsening since fall on tuesday -- palpable pulse in extremity   on ac, denies head injury

## 2024-12-13 NOTE — ED PROVIDER NOTE - CARE PLAN
Principal Discharge DX:	Humerus fracture  Secondary Diagnosis:	Wrist fracture  Secondary Diagnosis:	Fall   1

## 2024-12-13 NOTE — ED ADULT NURSE NOTE - NSFALLHARMRISKINTERV_ED_ALL_ED

## 2024-12-13 NOTE — ED PROVIDER NOTE - PROVIDER TOKENS
PROVIDER:[TOKEN:[[id number]],FOLLOWUP:[1-3 Days]],PROVIDER:[TOKEN:[[id number]],FOLLOWUP:[1-3 Days]]

## 2024-12-13 NOTE — ED PROVIDER NOTE - IN ACCORDANCE WITH [place] STATE LAW, WE OFFER EVERY PATIENT A HEPATITIS C TEST. WOULD YOU LIKE TO BE TESTED TODAY?
Assumed care of patient from night shift RN  DNR/DNI  A&O x 4  Pain 4/10 will medicate per MAR  PIV: NS at 83  GJ tube- bolus feedings and meds to be given through G tube  Mepilex to sacrum  1 assist with walker  Low fall risk  Patient sits in chair  Chair locked  Call light within reach  All questions answered and all needs met at this time  RN will implement hourly rounding and answer call lights appropriatly     Opt out

## 2024-12-13 NOTE — ED PROVIDER NOTE - CLINICAL SUMMARY MEDICAL DECISION MAKING FREE TEXT BOX
Patient status post fall one-step 4 days ago right upper extremity weakness bruising worsening pain    Independent interpretation of the Xray film(s) performed by: Aditya Escobar  Proximal humerus fracture    long arm splint, CT negative    Appropriate medications for patient's presenting complaints were ordered and effects were reassessed. Patient's external records were reviewed    Escalation to admission and/or observation was considered.  Patient feels much better and is comfortable with discharge.  Appropriate follow-up was arranged.

## 2024-12-17 ENCOUNTER — INPATIENT (INPATIENT)
Facility: HOSPITAL | Age: 79
LOS: 8 days | Discharge: HOME HEALTH | DRG: 948 | End: 2024-12-26
Attending: INTERNAL MEDICINE | Admitting: STUDENT IN AN ORGANIZED HEALTH CARE EDUCATION/TRAINING PROGRAM
Payer: MEDICARE

## 2024-12-17 VITALS
HEART RATE: 80 BPM | DIASTOLIC BLOOD PRESSURE: 80 MMHG | SYSTOLIC BLOOD PRESSURE: 123 MMHG | HEIGHT: 60 IN | OXYGEN SATURATION: 96 % | RESPIRATION RATE: 18 BRPM | WEIGHT: 179.9 LBS | TEMPERATURE: 100 F

## 2024-12-17 DIAGNOSIS — Z90.49 ACQUIRED ABSENCE OF OTHER SPECIFIED PARTS OF DIGESTIVE TRACT: Chronic | ICD-10-CM

## 2024-12-17 DIAGNOSIS — R41.82 ALTERED MENTAL STATUS, UNSPECIFIED: ICD-10-CM

## 2024-12-17 LAB
ANION GAP SERPL CALC-SCNC: 11 MMOL/L — SIGNIFICANT CHANGE UP (ref 7–14)
BASOPHILS # BLD AUTO: 0.03 K/UL — SIGNIFICANT CHANGE UP (ref 0–0.2)
BASOPHILS NFR BLD AUTO: 0.4 % — SIGNIFICANT CHANGE UP (ref 0–1)
BUN SERPL-MCNC: 32 MG/DL — HIGH (ref 10–20)
CALCIUM SERPL-MCNC: 10.1 MG/DL — SIGNIFICANT CHANGE UP (ref 8.4–10.5)
CHLORIDE SERPL-SCNC: 107 MMOL/L — SIGNIFICANT CHANGE UP (ref 98–110)
CO2 SERPL-SCNC: 25 MMOL/L — SIGNIFICANT CHANGE UP (ref 17–32)
CREAT SERPL-MCNC: 1 MG/DL — SIGNIFICANT CHANGE UP (ref 0.7–1.5)
EGFR: 57 ML/MIN/1.73M2 — LOW
EOSINOPHIL # BLD AUTO: 0.21 K/UL — SIGNIFICANT CHANGE UP (ref 0–0.7)
EOSINOPHIL NFR BLD AUTO: 2.5 % — SIGNIFICANT CHANGE UP (ref 0–8)
GLUCOSE SERPL-MCNC: 88 MG/DL — SIGNIFICANT CHANGE UP (ref 70–99)
HCT VFR BLD CALC: 33.2 % — LOW (ref 37–47)
HGB BLD-MCNC: 10.5 G/DL — LOW (ref 12–16)
IMM GRANULOCYTES NFR BLD AUTO: 0.7 % — HIGH (ref 0.1–0.3)
LYMPHOCYTES # BLD AUTO: 0.93 K/UL — LOW (ref 1.2–3.4)
LYMPHOCYTES # BLD AUTO: 11.3 % — LOW (ref 20.5–51.1)
MCHC RBC-ENTMCNC: 26.6 PG — LOW (ref 27–31)
MCHC RBC-ENTMCNC: 31.6 G/DL — LOW (ref 32–37)
MCV RBC AUTO: 84.3 FL — SIGNIFICANT CHANGE UP (ref 81–99)
MONOCYTES # BLD AUTO: 0.77 K/UL — HIGH (ref 0.1–0.6)
MONOCYTES NFR BLD AUTO: 9.3 % — SIGNIFICANT CHANGE UP (ref 1.7–9.3)
NEUTROPHILS # BLD AUTO: 6.24 K/UL — SIGNIFICANT CHANGE UP (ref 1.4–6.5)
NEUTROPHILS NFR BLD AUTO: 75.8 % — HIGH (ref 42.2–75.2)
NRBC # BLD: 0 /100 WBCS — SIGNIFICANT CHANGE UP (ref 0–0)
PLATELET # BLD AUTO: 258 K/UL — SIGNIFICANT CHANGE UP (ref 130–400)
PMV BLD: 10.5 FL — HIGH (ref 7.4–10.4)
POTASSIUM SERPL-MCNC: 4.9 MMOL/L — SIGNIFICANT CHANGE UP (ref 3.5–5)
POTASSIUM SERPL-SCNC: 4.9 MMOL/L — SIGNIFICANT CHANGE UP (ref 3.5–5)
RBC # BLD: 3.94 M/UL — LOW (ref 4.2–5.4)
RBC # FLD: 14.2 % — SIGNIFICANT CHANGE UP (ref 11.5–14.5)
SODIUM SERPL-SCNC: 143 MMOL/L — SIGNIFICANT CHANGE UP (ref 135–146)
WBC # BLD: 8.24 K/UL — SIGNIFICANT CHANGE UP (ref 4.8–10.8)
WBC # FLD AUTO: 8.24 K/UL — SIGNIFICANT CHANGE UP (ref 4.8–10.8)

## 2024-12-17 PROCEDURE — 86900 BLOOD TYPING SEROLOGIC ABO: CPT

## 2024-12-17 PROCEDURE — 97535 SELF CARE MNGMENT TRAINING: CPT | Mod: GO

## 2024-12-17 PROCEDURE — 83735 ASSAY OF MAGNESIUM: CPT

## 2024-12-17 PROCEDURE — 73200 CT UPPER EXTREMITY W/O DYE: CPT | Mod: MC,RT

## 2024-12-17 PROCEDURE — 70450 CT HEAD/BRAIN W/O DYE: CPT | Mod: 26,MC

## 2024-12-17 PROCEDURE — 73080 X-RAY EXAM OF ELBOW: CPT | Mod: 26,RT

## 2024-12-17 PROCEDURE — 85730 THROMBOPLASTIN TIME PARTIAL: CPT

## 2024-12-17 PROCEDURE — 71045 X-RAY EXAM CHEST 1 VIEW: CPT | Mod: 26

## 2024-12-17 PROCEDURE — 97116 GAIT TRAINING THERAPY: CPT | Mod: GP

## 2024-12-17 PROCEDURE — 99285 EMERGENCY DEPT VISIT HI MDM: CPT | Mod: 24

## 2024-12-17 PROCEDURE — 97162 PT EVAL MOD COMPLEX 30 MIN: CPT | Mod: GP

## 2024-12-17 PROCEDURE — 81001 URINALYSIS AUTO W/SCOPE: CPT

## 2024-12-17 PROCEDURE — 73110 X-RAY EXAM OF WRIST: CPT | Mod: 26,RT

## 2024-12-17 PROCEDURE — 73030 X-RAY EXAM OF SHOULDER: CPT | Mod: 26,RT

## 2024-12-17 PROCEDURE — 73110 X-RAY EXAM OF WRIST: CPT | Mod: RT

## 2024-12-17 PROCEDURE — 95819 EEG AWAKE AND ASLEEP: CPT

## 2024-12-17 PROCEDURE — 70551 MRI BRAIN STEM W/O DYE: CPT | Mod: MC

## 2024-12-17 PROCEDURE — 73120 X-RAY EXAM OF HAND: CPT | Mod: RT

## 2024-12-17 PROCEDURE — 80053 COMPREHEN METABOLIC PANEL: CPT

## 2024-12-17 PROCEDURE — 97166 OT EVAL MOD COMPLEX 45 MIN: CPT | Mod: GO

## 2024-12-17 PROCEDURE — 36415 COLL VENOUS BLD VENIPUNCTURE: CPT

## 2024-12-17 PROCEDURE — 85025 COMPLETE CBC W/AUTO DIFF WBC: CPT

## 2024-12-17 PROCEDURE — 85610 PROTHROMBIN TIME: CPT

## 2024-12-17 PROCEDURE — 99221 1ST HOSP IP/OBS SF/LOW 40: CPT

## 2024-12-17 PROCEDURE — 97530 THERAPEUTIC ACTIVITIES: CPT | Mod: GP

## 2024-12-17 PROCEDURE — 86850 RBC ANTIBODY SCREEN: CPT

## 2024-12-17 PROCEDURE — 86901 BLOOD TYPING SEROLOGIC RH(D): CPT

## 2024-12-17 NOTE — ED PROVIDER NOTE - CLINICAL SUMMARY MEDICAL DECISION MAKING FREE TEXT BOX
79-year-old female presents to the ED for visual hallucinations.  Recent fall with fractures to the right wrist and right shoulder.  Currently splinting is off and patient is unable to take care of ADLs.  Physical exam revealed a patient with ecchymosis to right arm and hand.  We obtained labs along with x-rays.  Labs reveal baseline anemia 10.5.  X-rays revealed displaced proximal humerus surgical neck fracture along with an impacted distal radius fracture.  CT head unremarkable.  Patient admitted for altered mental status extremity fractures.  Orthopedic consulted for evaluation and splinting.

## 2024-12-17 NOTE — ED ADULT NURSE NOTE - OBJECTIVE STATEMENT
pt with previous fracture in right arm, states that splint was placed too tightly and was unable to wait until ortho appointment so removed splint herself  also states that she has been having visual hallucinations and seeing people and objects that are not there.  pt alert & oriented x4 in ED. family at bedside

## 2024-12-17 NOTE — ED ADULT NURSE NOTE - NSFALLUNIVINTERV_ED_ALL_ED
Bed/Stretcher in lowest position, wheels locked, appropriate side rails in place/Call bell, personal items and telephone in reach/Instruct patient to call for assistance before getting out of bed/chair/stretcher/Non-slip footwear applied when patient is off stretcher/[place] to call system/Physically safe environment - no spills, clutter or unnecessary equipment/Purposeful proactive rounding/Room/bathroom lighting operational, light cord in reach

## 2024-12-17 NOTE — ED PROVIDER NOTE - CARE PLAN
Principal Discharge DX:	Altered mental status  Secondary Diagnosis:	Visual hallucinations  Secondary Diagnosis:	Other displaced fracture of proximal end of right humerus   1

## 2024-12-17 NOTE — ED PROVIDER NOTE - EKG/XRAY ADDITIONAL INFORMATION
Independent interpretation of the right shoulder X-Ray film(s) performed by Moon Devlin noted to have a displaced right humeral surgical neck fracture.

## 2024-12-17 NOTE — ED PROVIDER NOTE - PHYSICAL EXAMINATION
CONST: Well appearing in NAD  EYES: PERRL, EOMI, Sclera and conjunctiva clear.   ENT: dry mucous membranes  CARD: Normal rate and rhythm  RESP: Equal BS B/L, No wheezes, rhonchi or rales. No distress  GI: Soft, non-tender, non-distended.  MS: RUE with sling, (+) ecchymosis and swelling to right hand and arm, able to range digits but with discomfort, good cap refill  SKIN: Warm, dry, no acute rashes. Good turgor  NEURO: A&Ox3, No focal deficits. Strength 5/5 with no sensory deficits.

## 2024-12-17 NOTE — ED PROVIDER NOTE - OBJECTIVE STATEMENT
80yo f h/o fall down a few steps 1 week ago sustained fx to right wrist and shoulder unable to tolerate splint so family member removed it. Pt is sedentary at baseline and more so since the fall. 3 days ago pt started having visual hallucinations of family members who have passed away. Pt has good insight that these are hallucinations so she told her daughters. Due to it being consistent over the last 3 days her PCP recommended bringing her to the ED for evaluation. Pt has no complaints, has a good appetite, no new injury however has been consistently using her right arm despite the injury. Pt is able to perform ADLs independently sometimes requiring help, now needs assistance for showering but is able to ambulate

## 2024-12-17 NOTE — CONSULT NOTE ADULT - SUBJECTIVE AND OBJECTIVE BOX
ORTHOPAEDIC SURGERY CONSULT NOTE    Reason for Consult: R distal radius and R proximal humerus fracture    HPI: 79yFemale *HD presents with R shoulder and wrist pain s/p mechanical trip and fall down 1 step 8 days ago. pt states she landed on the RUE, denies head trauma or loc. pt reporting swelling, bruising and worsening pain to her RUE. She was taken to ED 3 days later, at that time found with R distal radius and proximal humerus fractures, placed into splint and discharged with ortho follow up. Patient represented today due to visual hallucinations of family members that passed away, she is admitted for AMS. Shee could not tolerate splint so family member removed it. pt was taking tylenol with minimal relief, states she can not move her RUE due to pain. denies fever, chills, chest pain, sob, numbness/tingling, weakness.     Pt has no complaints, has a good appetite, no new injury however has been consistently using her right arm despite the injury. Pt is able to perform ADLs independently sometimes requiring help, now needs assistance for showering but is able to ambulate      PAST MEDICAL & SURGICAL HISTORY:  HTN (hypertension)      OP (osteoporosis)      High cholesterol      Depression      S/P cholecystectomy        Allergies: penicillins (Swelling)    Medications:     PHYSICAL EXAM:  Vital Signs Last 24 Hrs  T(C): 37.8 (17 Dec 2024 18:43), Max: 37.8 (17 Dec 2024 18:43)  T(F): 100 (17 Dec 2024 18:43), Max: 100 (17 Dec 2024 18:43)  HR: 80 (17 Dec 2024 18:43) (80 - 80)  BP: 123/80 (17 Dec 2024 18:43) (123/80 - 123/80)  BP(mean): --  RR: 18 (17 Dec 2024 18:43) (18 - 18)  SpO2: 96% (17 Dec 2024 18:43) (96% - 96%)    Parameters below as of 17 Dec 2024 18:43  Patient On (Oxygen Delivery Method): room air        Physical exam:  Awake, alert  Non-labored breathing    RUE  Skin intact  No swelling/erythema/ecchymosis noted  No deformity/laceration/abrasion noted  ROM  TTP  Compartments soft and compressible, no pain w/ passive stretch of digits  SILT Ax/LABCN/R/M/U  AIN/PIN/U intact  Firing deltoid/biceps/triceps/wf/we/epl/fpl/fdp/io   Radial pulse 2+, cap refill <2    Labs:                        10.5   8.24  )-----------( 258      ( 17 Dec 2024 20:16 )             33.2     12-17    143  |  107  |  32[H]  ----------------------------<  88  4.9   |  25  |  1.0    Ca    10.1      17 Dec 2024 20:16          Imaging: R distal radius fracture shortened with mild dorsal angulation, displaced proximal humerus fracture, both stable compared to previous imaging.    A/P: 79y Female with R distal radius and proximal humerus fracture. Patient being admitted to hospital for altered mental status.    - Procedure: ***  - Pain control  - Activity: ***  - Keep cast/splint C/D/I. Cast/splint care explained.  - Extremity icing/elevation.  - Patient instructed to return to ED if any worsening pain, numbness, tingling, fevers, chills or any other concerning symptoms.  - F/U with  *** in 1 week. Please call [phone].    - Bed rest  - Pain control per primary team  - DVT PPx per primary, to be held on morning of procedure  - Patient requires Internal Medicine pre-op clearance / risk stratification / medical optimization  - Pre-Op CBC, CMP/BMP, PT/PTT/INR, Type & Screen x2, CXR, EKG, COVID test  - Trend Hgb  - 2u RBC on hold for surgery  - NPO for surgery tomorrow ***    *incomplete note* ORTHOPAEDIC SURGERY CONSULT NOTE    Reason for Consult: R distal radius and R proximal humerus fracture    HPI: 79yFemale ONEAL presents with R shoulder and wrist pain s/p mechanical trip and fall down 1 step 8 days ago. pt states she landed on the RUE, denies head trauma or loc. pt reporting swelling, bruising and worsening pain to her RUE. She was taken to ED 3 days later, at that time found with R distal radius and proximal humerus fractures, placed into splint and discharged with ortho follow up. Patient represented today due to visual hallucinations of family members that passed away, she is admitted for AMS. Shee could not tolerate splint so family member removed it. pt was taking tylenol with minimal relief, states she can not move her RUE due to pain. denies fever, chills, chest pain, sob, numbness/tingling, weakness.     Pt has no complaints, has a good appetite, no new injury however has been consistently using her right arm despite the injury. Pt is able to perform ADLs independently sometimes requiring help, now needs assistance for showering but is able to ambulate    Current smoker, 7 cigarettes/day     PAST MEDICAL & SURGICAL HISTORY:  HTN (hypertension)      OP (osteoporosis)      High cholesterol      Depression      S/P cholecystectomy        Allergies: penicillins (Swelling)    Medications:     PHYSICAL EXAM:  Vital Signs Last 24 Hrs  T(C): 37.8 (17 Dec 2024 18:43), Max: 37.8 (17 Dec 2024 18:43)  T(F): 100 (17 Dec 2024 18:43), Max: 100 (17 Dec 2024 18:43)  HR: 80 (17 Dec 2024 18:43) (80 - 80)  BP: 123/80 (17 Dec 2024 18:43) (123/80 - 123/80)  BP(mean): --  RR: 18 (17 Dec 2024 18:43) (18 - 18)  SpO2: 96% (17 Dec 2024 18:43) (96% - 96%)    Parameters below as of 17 Dec 2024 18:43  Patient On (Oxygen Delivery Method): room air        Physical exam:  Awake, alert  Non-labored breathing    RUE  Skin intact  Ecchymosis over R anterior shoulder/upper arm  Moderate swelling to wrist with TTP  TTP over shoulder  ROM limited due to pain  Compartments soft and compressible, no pain w/ passive stretch of digits  SILT Ax/LABCN/R/M/U  AIN/PIN/U intact  Firing deltoid/biceps/triceps/wf/we/epl/fpl/fdp/io   Radial pulse 2+, cap refill <2    Labs:                        10.5   8.24  )-----------( 258      ( 17 Dec 2024 20:16 )             33.2     12-17    143  |  107  |  32[H]  ----------------------------<  88  4.9   |  25  |  1.0    Ca    10.1      17 Dec 2024 20:16          Imaging: R distal radius fracture shortened with mild dorsal angulation, displaced proximal humerus fracture, both stable compared to previous imaging.    A/P: 79y Female with R distal radius and proximal humerus fracture. Patient being admitted to hospital for altered mental status.  Treatment options were reviewed including operative vs nonoperative management for her fractures. Will discuss with orthopedic traumatologist tomorrow if plan to pursue surgical management while admitted vs nonoperative treatment with outpatient follow up.   Please keep NPO after midnight     - Procedure: splint application to R wrist, sling placement to RUE  - Pain control  - Activity: NWB RUE  - Keep splint C/D/I. splint care explained.  - Pain control per primary team  - DVT PPx per primary  - Patient requires Internal Medicine pre-op clearance / risk stratification / medical optimization  - Pre-Op CBC, CMP/BMP, PT/PTT/INR, Type & Screen x2, CXR, EKG  - Trend Hgb  - NPO for possible surgery tomorrow

## 2024-12-17 NOTE — ED ADULT TRIAGE NOTE - CHIEF COMPLAINT QUOTE
bibems for hallucinations since Sunday, as per patient she was seeing dead family members in her house but knew they weren't really there. Pt with no psych history or known hx of dementia. Denies auditory hallucinations . Patient denies any SI/HI. Alert and oriented x 4 in triage. Denies any pain or dysuria

## 2024-12-18 LAB
ALBUMIN SERPL ELPH-MCNC: 3.5 G/DL — SIGNIFICANT CHANGE UP (ref 3.5–5.2)
ALP SERPL-CCNC: 97 U/L — SIGNIFICANT CHANGE UP (ref 30–115)
ALT FLD-CCNC: 18 U/L — SIGNIFICANT CHANGE UP (ref 0–41)
ANION GAP SERPL CALC-SCNC: 11 MMOL/L — SIGNIFICANT CHANGE UP (ref 7–14)
APPEARANCE UR: CLEAR — SIGNIFICANT CHANGE UP
APTT BLD: 29.3 SEC — SIGNIFICANT CHANGE UP (ref 27–39.2)
AST SERPL-CCNC: 24 U/L — SIGNIFICANT CHANGE UP (ref 0–41)
BACTERIA # UR AUTO: NEGATIVE /HPF — SIGNIFICANT CHANGE UP
BASOPHILS # BLD AUTO: 0.03 K/UL — SIGNIFICANT CHANGE UP (ref 0–0.2)
BASOPHILS NFR BLD AUTO: 0.4 % — SIGNIFICANT CHANGE UP (ref 0–1)
BILIRUB SERPL-MCNC: 0.6 MG/DL — SIGNIFICANT CHANGE UP (ref 0.2–1.2)
BILIRUB UR-MCNC: NEGATIVE — SIGNIFICANT CHANGE UP
BUN SERPL-MCNC: 30 MG/DL — HIGH (ref 10–20)
CALCIUM SERPL-MCNC: 10.2 MG/DL — SIGNIFICANT CHANGE UP (ref 8.4–10.5)
CAST: 3 /LPF — SIGNIFICANT CHANGE UP (ref 0–4)
CHLORIDE SERPL-SCNC: 108 MMOL/L — SIGNIFICANT CHANGE UP (ref 98–110)
CO2 SERPL-SCNC: 24 MMOL/L — SIGNIFICANT CHANGE UP (ref 17–32)
COLOR SPEC: YELLOW — SIGNIFICANT CHANGE UP
CREAT SERPL-MCNC: 1 MG/DL — SIGNIFICANT CHANGE UP (ref 0.7–1.5)
DIFF PNL FLD: ABNORMAL
EGFR: 57 ML/MIN/1.73M2 — LOW
EOSINOPHIL # BLD AUTO: 0.13 K/UL — SIGNIFICANT CHANGE UP (ref 0–0.7)
EOSINOPHIL NFR BLD AUTO: 1.7 % — SIGNIFICANT CHANGE UP (ref 0–8)
GLUCOSE SERPL-MCNC: 94 MG/DL — SIGNIFICANT CHANGE UP (ref 70–99)
GLUCOSE UR QL: NEGATIVE MG/DL — SIGNIFICANT CHANGE UP
HCT VFR BLD CALC: 31.6 % — LOW (ref 37–47)
HGB BLD-MCNC: 10.1 G/DL — LOW (ref 12–16)
IMM GRANULOCYTES NFR BLD AUTO: 0.6 % — HIGH (ref 0.1–0.3)
INR BLD: 1.05 RATIO — SIGNIFICANT CHANGE UP (ref 0.65–1.3)
KETONES UR-MCNC: NEGATIVE MG/DL — SIGNIFICANT CHANGE UP
LEUKOCYTE ESTERASE UR-ACNC: ABNORMAL
LYMPHOCYTES # BLD AUTO: 0.81 K/UL — LOW (ref 1.2–3.4)
LYMPHOCYTES # BLD AUTO: 10.3 % — LOW (ref 20.5–51.1)
MAGNESIUM SERPL-MCNC: 1.9 MG/DL — SIGNIFICANT CHANGE UP (ref 1.8–2.4)
MCHC RBC-ENTMCNC: 26.9 PG — LOW (ref 27–31)
MCHC RBC-ENTMCNC: 32 G/DL — SIGNIFICANT CHANGE UP (ref 32–37)
MCV RBC AUTO: 84.3 FL — SIGNIFICANT CHANGE UP (ref 81–99)
MONOCYTES # BLD AUTO: 0.67 K/UL — HIGH (ref 0.1–0.6)
MONOCYTES NFR BLD AUTO: 8.6 % — SIGNIFICANT CHANGE UP (ref 1.7–9.3)
NEUTROPHILS # BLD AUTO: 6.14 K/UL — SIGNIFICANT CHANGE UP (ref 1.4–6.5)
NEUTROPHILS NFR BLD AUTO: 78.4 % — HIGH (ref 42.2–75.2)
NITRITE UR-MCNC: NEGATIVE — SIGNIFICANT CHANGE UP
NRBC # BLD: 0 /100 WBCS — SIGNIFICANT CHANGE UP (ref 0–0)
PH UR: 5.5 — SIGNIFICANT CHANGE UP (ref 5–8)
PLATELET # BLD AUTO: 251 K/UL — SIGNIFICANT CHANGE UP (ref 130–400)
PMV BLD: 10.7 FL — HIGH (ref 7.4–10.4)
POTASSIUM SERPL-MCNC: 4.2 MMOL/L — SIGNIFICANT CHANGE UP (ref 3.5–5)
POTASSIUM SERPL-SCNC: 4.2 MMOL/L — SIGNIFICANT CHANGE UP (ref 3.5–5)
PROT SERPL-MCNC: 5.7 G/DL — LOW (ref 6–8)
PROT UR-MCNC: 30 MG/DL
PROTHROM AB SERPL-ACNC: 12.4 SEC — SIGNIFICANT CHANGE UP (ref 9.95–12.87)
RBC # BLD: 3.75 M/UL — LOW (ref 4.2–5.4)
RBC # FLD: 14.3 % — SIGNIFICANT CHANGE UP (ref 11.5–14.5)
RBC CASTS # UR COMP ASSIST: 2 /HPF — SIGNIFICANT CHANGE UP (ref 0–4)
SODIUM SERPL-SCNC: 143 MMOL/L — SIGNIFICANT CHANGE UP (ref 135–146)
SP GR SPEC: 1.02 — SIGNIFICANT CHANGE UP (ref 1–1.03)
SQUAMOUS # UR AUTO: 3 /HPF — SIGNIFICANT CHANGE UP (ref 0–5)
UROBILINOGEN FLD QL: 1 MG/DL — SIGNIFICANT CHANGE UP (ref 0.2–1)
WBC # BLD: 7.83 K/UL — SIGNIFICANT CHANGE UP (ref 4.8–10.8)
WBC # FLD AUTO: 7.83 K/UL — SIGNIFICANT CHANGE UP (ref 4.8–10.8)
WBC UR QL: 1 /HPF — SIGNIFICANT CHANGE UP (ref 0–5)

## 2024-12-18 PROCEDURE — 99222 1ST HOSP IP/OBS MODERATE 55: CPT

## 2024-12-18 PROCEDURE — 70551 MRI BRAIN STEM W/O DYE: CPT | Mod: 26

## 2024-12-18 PROCEDURE — 73200 CT UPPER EXTREMITY W/O DYE: CPT | Mod: 26,RT

## 2024-12-18 PROCEDURE — 73110 X-RAY EXAM OF WRIST: CPT | Mod: 26,RT

## 2024-12-18 RX ORDER — MAG HYDROX/ALUMINUM HYD/SIMETH 200-200-20
30 SUSPENSION, ORAL (FINAL DOSE FORM) ORAL EVERY 4 HOURS
Refills: 0 | Status: DISCONTINUED | OUTPATIENT
Start: 2024-12-18 | End: 2024-12-26

## 2024-12-18 RX ORDER — THIAMINE HYDROCHLORIDE 100 MG/ML
500 INJECTION, SOLUTION INTRAMUSCULAR; INTRAVENOUS DAILY
Refills: 0 | Status: DISCONTINUED | OUTPATIENT
Start: 2024-12-18 | End: 2024-12-20

## 2024-12-18 RX ORDER — SERTRALINE HYDROCHLORIDE 25 MG/1
25 TABLET ORAL AT BEDTIME
Refills: 0 | Status: DISCONTINUED | OUTPATIENT
Start: 2024-12-18 | End: 2024-12-26

## 2024-12-18 RX ORDER — APIXABAN 5 MG/1
2.5 TABLET, FILM COATED ORAL EVERY 12 HOURS
Refills: 0 | Status: DISCONTINUED | OUTPATIENT
Start: 2024-12-18 | End: 2024-12-26

## 2024-12-18 RX ORDER — SERTRALINE HYDROCHLORIDE 25 MG/1
1 TABLET ORAL
Refills: 0 | DISCHARGE

## 2024-12-18 RX ORDER — ROSUVASTATIN 40 MG/1
1 TABLET, FILM COATED ORAL
Refills: 0 | DISCHARGE

## 2024-12-18 RX ORDER — BUPROPION HYDROCHLORIDE 150 MG/1
1 TABLET, EXTENDED RELEASE ORAL
Refills: 0 | DISCHARGE

## 2024-12-18 RX ORDER — ONDANSETRON 4 MG/1
4 TABLET ORAL EVERY 8 HOURS
Refills: 0 | Status: DISCONTINUED | OUTPATIENT
Start: 2024-12-18 | End: 2024-12-26

## 2024-12-18 RX ORDER — FUROSEMIDE 20 MG
1 TABLET ORAL
Refills: 0 | DISCHARGE

## 2024-12-18 RX ORDER — BUPROPION HYDROCHLORIDE 150 MG/1
75 TABLET, EXTENDED RELEASE ORAL DAILY
Refills: 0 | Status: DISCONTINUED | OUTPATIENT
Start: 2024-12-18 | End: 2024-12-18

## 2024-12-18 RX ORDER — ROSUVASTATIN 40 MG/1
40 TABLET, FILM COATED ORAL AT BEDTIME
Refills: 0 | Status: DISCONTINUED | OUTPATIENT
Start: 2024-12-18 | End: 2024-12-26

## 2024-12-18 RX ORDER — GINKGO BILOBA 40 MG
3 CAPSULE ORAL AT BEDTIME
Refills: 0 | Status: DISCONTINUED | OUTPATIENT
Start: 2024-12-18 | End: 2024-12-26

## 2024-12-18 RX ORDER — SERTRALINE HYDROCHLORIDE 25 MG/1
100 TABLET ORAL DAILY
Refills: 0 | Status: DISCONTINUED | OUTPATIENT
Start: 2024-12-18 | End: 2024-12-26

## 2024-12-18 RX ORDER — APIXABAN 5 MG/1
1 TABLET, FILM COATED ORAL
Refills: 0 | DISCHARGE

## 2024-12-18 RX ORDER — ACETAMINOPHEN 80 MG/.8ML
650 SOLUTION/ DROPS ORAL EVERY 6 HOURS
Refills: 0 | Status: DISCONTINUED | OUTPATIENT
Start: 2024-12-18 | End: 2024-12-26

## 2024-12-18 RX ORDER — VITAMIN A 10000 UNIT
1 TABLET ORAL DAILY
Refills: 0 | Status: DISCONTINUED | OUTPATIENT
Start: 2024-12-18 | End: 2024-12-26

## 2024-12-18 RX ORDER — FUROSEMIDE 20 MG
20 TABLET ORAL
Refills: 0 | Status: DISCONTINUED | OUTPATIENT
Start: 2024-12-18 | End: 2024-12-26

## 2024-12-18 RX ADMIN — THIAMINE HYDROCHLORIDE 105 MILLIGRAM(S): 100 INJECTION, SOLUTION INTRAMUSCULAR; INTRAVENOUS at 16:33

## 2024-12-18 RX ADMIN — ROSUVASTATIN 40 MILLIGRAM(S): 40 TABLET, FILM COATED ORAL at 22:30

## 2024-12-18 RX ADMIN — SERTRALINE HYDROCHLORIDE 25 MILLIGRAM(S): 25 TABLET ORAL at 22:31

## 2024-12-18 RX ADMIN — APIXABAN 2.5 MILLIGRAM(S): 5 TABLET, FILM COATED ORAL at 17:26

## 2024-12-18 NOTE — H&P ADULT - ATTENDING COMMENTS
79 year old female past medical history of  HTN, HLD presenting for a fall down a few steps 1 week ago and visual hallucinations. She was evaluated after the fall and was found to have a fracture in the right wrist and shoulder. She was placed in a sling, but has been unable to tolerate splint so family member removed it.     #Visual hallucinations  Possible related to daily alcohol use. Pt states hallucinations have stopped since being in ER  - Monitor for alcohol withdrawal, CIWA protocol  - fu MR NC  - Monitor for further episodes inpt, Psych eval if visual hallucinations recur    #Right proximal humerus fracture  #Right distal radius fracture  Ortho eval noted  - Sling placed  - Non-operative at this point in time  - PT/OT eval    #HTN/HLD  On lasix for chronic LE edema  - Cont lasix 20 TTS  - Cont crestor    #Hx of DVT  - Cont Eliquis    #Anxiety/depression  - Cont sertraline 100/25  - On bupropion 75 qD at home, but not currently available. resume outpatient    DVT PPX, Eliquis    #Progress Note Handoff  Pending (specify): fu MR Head, PT/OT, monitor for further hallucinations  Family discussion: kizzy pt regarding plan of care  Disposition: GMF, from home

## 2024-12-18 NOTE — H&P ADULT - HISTORY OF PRESENT ILLNESS
79 year old female past medical history of  HTN, HLD, ?Afib on Eliquis presenting for a fall down a few steps 1 week ago. She was evaluated after the fall and was found to have a fracture in the right wrist and shoulder. She was placed in a sling, but has been unable to tolerate splint so family member removed it.     Patient is sedentary at baseline and more so since the fall. Also, 3 days ago the patient started having visual hallucinations of family members who have passed away. Patient has good insight that these are just hallucinations per her daughters. Due to it being consistent over the last 3 days her PCP recommended bringing her to the ED for evaluation. Patient has no complaints, has a good appetite, no new injury however has been consistently using her right arm despite the injury. Patient was able to perform ADLs independently sometimes requiring help, now needs assistance for showering but is able to ambulate.     In the ED vital signs were /80, HR 80, Afebrile, 96% on RA. CT head showed chronic microvascular changes. CT chest/abdomen/pelvis showed partially imaged acute impacted and displaced right humerus surgical neck fracture with mild surrounding soft tissue/muscular hematoma with stranding around the right pectoralis major possibly related to superficial contusion or extension from the shoulder injury. Also showed age indeterminate (new since lumbar spine MRI 8/1/2019) though probably chronic appearing moderate to severe L2, moderate L3 and mild L4 compression deformities. Unchanged chronic severe T12 compression with similar superior corner retropulsion. Labs significant only for a hemoglobin of 10.5.       Vital Signs Last 24 Hrs  T(C): 37.1 (18 Dec 2024 07:56), Max: 37.8 (17 Dec 2024 18:43)  T(F): 98.7 (18 Dec 2024 07:56), Max: 100 (17 Dec 2024 18:43)  HR: 90 (18 Dec 2024 07:56) (80 - 90)  BP: 166/91 (18 Dec 2024 07:56) (123/80 - 166/91)  BP(mean): --  RR: 18 (18 Dec 2024 07:56) (18 - 18)  SpO2: 95% (18 Dec 2024 07:56) (95% - 96%)    Parameters below as of 18 Dec 2024 07:56  Patient On (Oxygen Delivery Method): room air     79 year old female past medical history of  HTN, HLD, ?Afib on Eliquis presenting for a fall down a few steps 1 week ago. She was evaluated after the fall and was found to have a fracture in the right wrist and shoulder. She was placed in a sling, but has been unable to tolerate splint so family member removed it.     Patient is sedentary at baseline and more so since the fall. Also, 3 days ago the patient started having visual hallucinations of family members who have passed away. Patient has good insight that these are just hallucinations per her daughters. Due to it being consistent over the last 3 days her PCP recommended bringing her to the ED for evaluation. Patient has no complaints, has a good appetite, no new injury however has been consistently using her right arm despite the injury. She has also been weaker in her legs, unable to get to the bathroom and having significant urinary incontinence      In the ED vital signs were /80, HR 80, Afebrile, 96% on RA. CT head showed chronic microvascular changes. CT chest/abdomen/pelvis showed partially imaged acute impacted and displaced right humerus surgical neck fracture with mild surrounding soft tissue/muscular hematoma with stranding around the right pectoralis major possibly related to superficial contusion or extension from the shoulder injury. Also showed age indeterminate (new since lumbar spine MRI 8/1/2019) though probably chronic appearing moderate to severe L2, moderate L3 and mild L4 compression deformities. Unchanged chronic severe T12 compression with similar superior corner retropulsion. Labs significant only for a hemoglobin of 10.5.       Vital Signs Last 24 Hrs  T(C): 37.1 (18 Dec 2024 07:56), Max: 37.8 (17 Dec 2024 18:43)  T(F): 98.7 (18 Dec 2024 07:56), Max: 100 (17 Dec 2024 18:43)  HR: 90 (18 Dec 2024 07:56) (80 - 90)  BP: 166/91 (18 Dec 2024 07:56) (123/80 - 166/91)  BP(mean): --  RR: 18 (18 Dec 2024 07:56) (18 - 18)  SpO2: 95% (18 Dec 2024 07:56) (95% - 96%)    Parameters below as of 18 Dec 2024 07:56  Patient On (Oxygen Delivery Method): room air     79 year old female past medical history of  HTN, HLD, ?Afib on Eliquis presenting for a fall down a few steps 1 week ago. She was evaluated after the fall and was found to have a fracture in the right wrist and shoulder. She was placed in a sling, but has been unable to tolerate splint so family member removed it.     Patient is sedentary at baseline and more so since the fall. Also, 3 days ago the patient started having visual hallucinations of family members who have passed away. Patient has good insight that these are just hallucinations per her daughters. Due to it being consistent over the last 3 days her PCP recommended bringing her to the ED for evaluation. Patient has no complaints, has a good appetite, no new injury however has been consistently using her right arm despite the injury. She has also been weaker in her legs, unable to get to the bathroom and having significant urinary incontinence. Patient also endorses daily alcohol use with 1-3 glassed of wine/drinks per day.     In the ED vital signs were /80, HR 80, Afebrile, 96% on RA. CT head showed chronic microvascular changes. CT chest/abdomen/pelvis showed partially imaged acute impacted and displaced right humerus surgical neck fracture with mild surrounding soft tissue/muscular hematoma with stranding around the right pectoralis major possibly related to superficial contusion or extension from the shoulder injury. Also showed age indeterminate (new since lumbar spine MRI 8/1/2019) though probably chronic appearing moderate to severe L2, moderate L3 and mild L4 compression deformities. Unchanged chronic severe T12 compression with similar superior corner retropulsion. Labs significant only for a hemoglobin of 10.5.       Vital Signs Last 24 Hrs  T(C): 37.1 (18 Dec 2024 07:56), Max: 37.8 (17 Dec 2024 18:43)  T(F): 98.7 (18 Dec 2024 07:56), Max: 100 (17 Dec 2024 18:43)  HR: 90 (18 Dec 2024 07:56) (80 - 90)  BP: 166/91 (18 Dec 2024 07:56) (123/80 - 166/91)  BP(mean): --  RR: 18 (18 Dec 2024 07:56) (18 - 18)  SpO2: 95% (18 Dec 2024 07:56) (95% - 96%)    Parameters below as of 18 Dec 2024 07:56  Patient On (Oxygen Delivery Method): room air

## 2024-12-18 NOTE — PATIENT PROFILE ADULT - FALL HARM RISK - RISK INTERVENTIONS

## 2024-12-18 NOTE — PATIENT PROFILE ADULT - FUNCTIONAL ASSESSMENT - DAILY ACTIVITY 2.
The patient was received in the bed resting and he is A/O x 3 . He is Greek speaking and the  was used - [employer] #[id number]. The patient answered all questions asked of him and his medication was administered per the MAR. He was in pain on the scale of of a 9 and morphine was administered. Safety includes his call light within reach, and his bed at the lowest level. Will continue to monitor throughout the shift.   2 = A lot of assistance

## 2024-12-18 NOTE — H&P ADULT - ASSESSMENT
79 year old female past medical history of  HTN, HLD, ?Afib on Eliquis presenting for a fall down a few steps 1 week ago and visual hallucinations.     #Visual Hallucinations  - Patient having hallucinations x3 days  - Has insight, knows they are not real  - Seeing dead family memebers  - CT Head chronic microvascular changes    Plan:   - UA w/ urine culture  - MR Brain w/ and w/out  - rEEG   - Pain control without opioid medications    #Right proximal humerus fracture  #Right distal radius fracture  - Patient fell 1 week ago  - Was initially placed in a sling, however did not tolerate so family took it off     Plan:  - Evaluated by Ortho, non-operative at this point in time   - Reassess in 2-3 weeks with new imaging  - Continue with sling  - Pain control with Tylenol/Tramadol         #Misc  - DVT Prophylaxis: ?Eliquis   - GI Prophylaxis: None  - Diet: Dash   - Activity: NWB RUE  - IV Fluids: None  - Code Status: Full     Dispo: Anticipate 24-48 hours  79 year old female past medical history of  HTN, HLD, ?Afib on Eliquis presenting for a fall down a few steps 1 week ago and visual hallucinations.     #Visual Hallucinations  - Patient having hallucinations x3 days  - Has insight, knows they are not real  - Seeing dead family memebers  - CT Head chronic microvascular changes    Plan:   - UA w/ urine culture  - MR Brain w/ and w/out  - rEEG   - Pain control without opioid medications    #Right proximal humerus fracture  #Right distal radius fracture  - Patient fell 1 week ago  - Was initially placed in a sling, however did not tolerate so family took it off     Plan:  - Evaluated by Ortho, non-operative at this point in time   - Reassess in 2-3 weeks with new imaging  - Continue with sling  - Pain control with Tylenol/Tramadol   - PT/OT Consult      #Misc  - DVT Prophylaxis: ?Eliquis   - GI Prophylaxis: None  - Diet: Dash   - Activity: NWB RUE  - IV Fluids: None  - Code Status: Full     Dispo: Anticipate 24-48 hours  79 year old female past medical history of  HTN, HLD, ?Afib on Eliquis presenting for a fall down a few steps 1 week ago and visual hallucinations.     #Visual Hallucinations  - Patient having hallucinations x3 days  - Has insight, knows they are not real  - Seeing dead family memebers  - CT Head chronic microvascular changes    Plan:   - UA w/ urine culture  - MR Brain w/ and w/out  - rEEG   - Pain control without opioid medications    #Right proximal humerus fracture  #Right distal radius fracture  - Patient fell 1 week ago  - Was initially placed in a sling, however did not tolerate so family took it off     Plan:  - Evaluated by Ortho, non-operative at this point in time   - Reassess in 2-3 weeks with new imaging  - Continue with sling  - Pain control with Tylenol/Tramadol   - PT/OT Consult      #Misc  - DVT Prophylaxis: ?Eliquis   - GI Prophylaxis: None  - Diet: Dash   - Activity: NWB RUE  - IV Fluids: None  - Code Status: Full     Dispo: Anticipate 24-48 hours     ** List of medications was lost in ED, daughter will bring them. Med rec NOT completed ** 79 year old female past medical history of  HTN, HLD, ?Afib on Eliquis presenting for a fall down a few steps 1 week ago and visual hallucinations.     #Visual Hallucinations  #?Alcohol withdrawl  - Patient having hallucinations x3 days  - Has insight, knows they are not real  - Seeing dead family memebers  - CT Head chronic microvascular changes    Plan:   - Thiamine + Folate supplementations  - [facility] protocol   - UA w/ urine culture  - MR Brain w/ and w/out  - rEEG   - Pain control without opioid medications    #Right proximal humerus fracture  #Right distal radius fracture  - Patient fell 1 week ago  - Was initially placed in a sling, however did not tolerate so family took it off     Plan:  - Evaluated by Ortho, non-operative at this point in time   - Reassess in 2-3 weeks with new imaging  - Continue with sling  - Pain control with Tylenol/Tramadol   - PT/OT Consult      #Misc  - DVT Prophylaxis: ?Eliquis   - GI Prophylaxis: None  - Diet: Dash   - Activity: NWB RUE  - IV Fluids: None  - Code Status: Full     Dispo: Anticipate 24-48 hours     ** List of medications was lost in ED, daughter will bring them. Med rec NOT completed **

## 2024-12-18 NOTE — PROGRESS NOTE ADULT - SUBJECTIVE AND OBJECTIVE BOX
Orthopedic Surgery Progress Note       Discussed case with trauma attending dr. ramirez  patient will not have surgery for right proximal humerus and/or right distal radius at this time  trial of non operative management  will re-asses with new images in 2-3 weeks if operative intervention is necessary for right proximal humerus  right distal radius will be definitively treated non-op  NWB RUE  sling in place     f.u outpatient with  in 2-3 weeks   please call [phone] for an appointment

## 2024-12-18 NOTE — PROCEDURE NOTE - NSINDICATIONS_GEN_A_CORE
history of difficult urethral catheterization/obtain UA/obtain urine culture/urinry obstruction or retention

## 2024-12-18 NOTE — H&P ADULT - NSHPPHYSICALEXAM_GEN_ALL_CORE
PHYSICAL EXAM:  GENERAL: NAD, well-groomed, well-developed  HEAD:  Atraumatic, Normocephalic  EYES: EOMI, PERRLA, conjunctiva and sclera clear  ENMT: No tonsillar erythema, exudates, or enlargement; Moist mucous membranes  NECK: Supple, No JVD, Normal thyroid  HEART: Regular rate and rhythm; No murmurs, rubs, or gallops  RESPIRATORY: CTA B/L, No W/R/R  ABDOMEN: Soft, Nontender, Nondistended; Bowel sounds present  NEUROLOGY: A&Ox3, nonfocal, moving all extremities  EXTREMITIES:  2+ Peripheral Pulses, No clubbing, cyanosis, or edema  SKIN: warm, dry, normal color, no rash or abnormal lesions PHYSICAL EXAM:  GENERAL: NAD, well-groomed, well-developed  HEAD:  Atraumatic, Normocephalic  EYES: EOMI, PERRLA, conjunctiva and sclera clear  ENMT: No tonsillar erythema, exudates, or enlargement; Moist mucous membranes  NECK: Supple, No JVD, Normal thyroid  HEART: Regular rate and rhythm; No murmurs, rubs, or gallops  RESPIRATORY: CTA B/L, No W/R/R  ABDOMEN: Soft, Nontender, Nondistended; Bowel sounds present  NEUROLOGY: A&Ox3, nonfocal, RUE wrapped in ace wrap in a bandage, ecchymosis to the extremity.   EXTREMITIES:  2+ Peripheral Pulses, No clubbing, cyanosis, or edema  SKIN: warm, dry, normal color, no rash or abnormal lesions

## 2024-12-19 LAB
ALBUMIN SERPL ELPH-MCNC: 3.2 G/DL — LOW (ref 3.5–5.2)
ALP SERPL-CCNC: 91 U/L — SIGNIFICANT CHANGE UP (ref 30–115)
ALT FLD-CCNC: 19 U/L — SIGNIFICANT CHANGE UP (ref 0–41)
ANION GAP SERPL CALC-SCNC: 10 MMOL/L — SIGNIFICANT CHANGE UP (ref 7–14)
AST SERPL-CCNC: 22 U/L — SIGNIFICANT CHANGE UP (ref 0–41)
BASOPHILS # BLD AUTO: 0.02 K/UL — SIGNIFICANT CHANGE UP (ref 0–0.2)
BASOPHILS NFR BLD AUTO: 0.3 % — SIGNIFICANT CHANGE UP (ref 0–1)
BILIRUB SERPL-MCNC: 0.6 MG/DL — SIGNIFICANT CHANGE UP (ref 0.2–1.2)
BUN SERPL-MCNC: 28 MG/DL — HIGH (ref 10–20)
CALCIUM SERPL-MCNC: 10.1 MG/DL — SIGNIFICANT CHANGE UP (ref 8.4–10.5)
CHLORIDE SERPL-SCNC: 108 MMOL/L — SIGNIFICANT CHANGE UP (ref 98–110)
CO2 SERPL-SCNC: 25 MMOL/L — SIGNIFICANT CHANGE UP (ref 17–32)
CREAT SERPL-MCNC: 1 MG/DL — SIGNIFICANT CHANGE UP (ref 0.7–1.5)
EGFR: 57 ML/MIN/1.73M2 — LOW
EOSINOPHIL # BLD AUTO: 0.18 K/UL — SIGNIFICANT CHANGE UP (ref 0–0.7)
EOSINOPHIL NFR BLD AUTO: 2.6 % — SIGNIFICANT CHANGE UP (ref 0–8)
GLUCOSE SERPL-MCNC: 93 MG/DL — SIGNIFICANT CHANGE UP (ref 70–99)
HCT VFR BLD CALC: 31.9 % — LOW (ref 37–47)
HGB BLD-MCNC: 10.1 G/DL — LOW (ref 12–16)
IMM GRANULOCYTES NFR BLD AUTO: 0.7 % — HIGH (ref 0.1–0.3)
LYMPHOCYTES # BLD AUTO: 1.02 K/UL — LOW (ref 1.2–3.4)
LYMPHOCYTES # BLD AUTO: 14.6 % — LOW (ref 20.5–51.1)
MCHC RBC-ENTMCNC: 26.6 PG — LOW (ref 27–31)
MCHC RBC-ENTMCNC: 31.7 G/DL — LOW (ref 32–37)
MCV RBC AUTO: 84.2 FL — SIGNIFICANT CHANGE UP (ref 81–99)
MONOCYTES # BLD AUTO: 0.6 K/UL — SIGNIFICANT CHANGE UP (ref 0.1–0.6)
MONOCYTES NFR BLD AUTO: 8.6 % — SIGNIFICANT CHANGE UP (ref 1.7–9.3)
NEUTROPHILS # BLD AUTO: 5.14 K/UL — SIGNIFICANT CHANGE UP (ref 1.4–6.5)
NEUTROPHILS NFR BLD AUTO: 73.2 % — SIGNIFICANT CHANGE UP (ref 42.2–75.2)
NRBC # BLD: 0 /100 WBCS — SIGNIFICANT CHANGE UP (ref 0–0)
PLATELET # BLD AUTO: 267 K/UL — SIGNIFICANT CHANGE UP (ref 130–400)
PMV BLD: 10.3 FL — SIGNIFICANT CHANGE UP (ref 7.4–10.4)
POTASSIUM SERPL-MCNC: 4.1 MMOL/L — SIGNIFICANT CHANGE UP (ref 3.5–5)
POTASSIUM SERPL-SCNC: 4.1 MMOL/L — SIGNIFICANT CHANGE UP (ref 3.5–5)
PROT SERPL-MCNC: 5.7 G/DL — LOW (ref 6–8)
RBC # BLD: 3.79 M/UL — LOW (ref 4.2–5.4)
RBC # FLD: 14.3 % — SIGNIFICANT CHANGE UP (ref 11.5–14.5)
SODIUM SERPL-SCNC: 143 MMOL/L — SIGNIFICANT CHANGE UP (ref 135–146)
WBC # BLD: 7.01 K/UL — SIGNIFICANT CHANGE UP (ref 4.8–10.8)
WBC # FLD AUTO: 7.01 K/UL — SIGNIFICANT CHANGE UP (ref 4.8–10.8)

## 2024-12-19 PROCEDURE — 99231 SBSQ HOSP IP/OBS SF/LOW 25: CPT

## 2024-12-19 PROCEDURE — 95816 EEG AWAKE AND DROWSY: CPT | Mod: 26

## 2024-12-19 RX ADMIN — Medication 20 MILLIGRAM(S): at 05:37

## 2024-12-19 RX ADMIN — Medication 1 MILLIGRAM(S): at 12:18

## 2024-12-19 RX ADMIN — ROSUVASTATIN 40 MILLIGRAM(S): 40 TABLET, FILM COATED ORAL at 21:55

## 2024-12-19 RX ADMIN — APIXABAN 2.5 MILLIGRAM(S): 5 TABLET, FILM COATED ORAL at 17:52

## 2024-12-19 RX ADMIN — SERTRALINE HYDROCHLORIDE 25 MILLIGRAM(S): 25 TABLET ORAL at 21:52

## 2024-12-19 RX ADMIN — APIXABAN 2.5 MILLIGRAM(S): 5 TABLET, FILM COATED ORAL at 05:37

## 2024-12-19 RX ADMIN — SERTRALINE HYDROCHLORIDE 100 MILLIGRAM(S): 25 TABLET ORAL at 12:18

## 2024-12-19 RX ADMIN — THIAMINE HYDROCHLORIDE 105 MILLIGRAM(S): 100 INJECTION, SOLUTION INTRAMUSCULAR; INTRAVENOUS at 12:13

## 2024-12-19 NOTE — OCCUPATIONAL THERAPY INITIAL EVALUATION ADULT - GENERAL OBSERVATIONS, REHAB EVAL
Pt encountered reclined in bed, + IV locked, + RUE splint w/ sling, + alamo. Pt agreeable to bedside OT assessment, may be seen as confirmed with RN. Pt returned to bedside chair, + IV locked, + RUE splint w/ sling, + alamo, + chair alarm

## 2024-12-19 NOTE — OCCUPATIONAL THERAPY INITIAL EVALUATION ADULT - PERTINENT HX OF CURRENT PROBLEM, REHAB EVAL
79 year old female past medical history of  HTN, HLD, ?Afib on Eliquis presenting for a fall down a few steps 1 week ago. She was evaluated after the fall and was found to have a fracture in the right wrist and shoulder. She was placed in a sling, but has been unable to tolerate splint so family member removed it.     Patient is sedentary at baseline and more so since the fall. Also, 3 days ago the patient started having visual hallucinations of family members who have passed away. Patient has good insight that these are just hallucinations per her daughters. Due to it being consistent over the last 3 days her PCP recommended bringing her to the ED for evaluation. Patient has no complaints, has a good appetite, no new injury however has been consistently using her right arm despite the injury. She has also been weaker in her legs, unable to get to the bathroom and having significant urinary incontinence. Patient also endorses daily alcohol use with 1-3 glassed of wine/drinks per day.     In the ED vital signs were /80, HR 80, Afebrile, 96% on RA. CT head showed chronic microvascular changes. CT chest/abdomen/pelvis showed partially imaged acute impacted and displaced right humerus surgical neck fracture with mild surrounding soft tissue/muscular hematoma with stranding around the right pectoralis major possibly related to superficial contusion or extension from the shoulder injury. Also showed age indeterminate (new since lumbar spine MRI 8/1/2019) though probably chronic appearing moderate to severe L2, moderate L3 and mild L4 compression deformities. Unchanged chronic severe T12 compression with similar superior corner retropulsion. Labs significant only for a hemoglobin of 10.5.

## 2024-12-19 NOTE — OCCUPATIONAL THERAPY INITIAL EVALUATION ADULT - NSACTIVITYREC_GEN_A_OT
Patient requires assistance with activities of daily living. OT recommends D/C to rehabilitation facility when medically appropriate.  Refer to evaluation/treatment for details.

## 2024-12-19 NOTE — PROGRESS NOTE ADULT - CONVERSATION DETAILS
I dw the daughter Janine the GOCs for her mother, she wants her to receive all medical measures including intubation and resuscitations iof needed.

## 2024-12-19 NOTE — EEG REPORT - NS EEG TEXT BOX
[facility] of Neurology  Inpatient Routine-EEG Report      Patient Name:	CHELSEY MOSQUERA    :	1945  MRN:	[id number]  Study Date/Time:	2024, 12:20:11 PM  Referred by:	-    Brief Clinical History:  CHELSEY MOSQUERA is a 79 year old Female; study performed to investigate for seizures or markers of epilepsy.   Diagnosis Code: R40.4 Transient alteration of awareness    Patient Medication:  MELATONIN    TYLENOL    Zofran      Acquisition Details:  Electroencephalography was acquired using a minimum of 21 channels on an [employer] system v 9.3.1 with electrode placement according to the standard International 10-20 system following ACNS (American Clinical Neurophysiology Society) guidelines.  Anterior temporal T1 and T2 electrodes were utilized whenever possible.   The [employer] automated spike & seizure detections were all reviewed in detail, in addition to the entire raw EEG.    Findings:  Background:  continuous.   Voltage:  Normal (20uV)  Organization:  Rudimentary  Posterior Dominant Rhythm:  8-8.5 Hz symmetric, well-organized, and well-modulated  Variability:  Yes	Reactivity:  Yes  Sleep:  Absent.  Focal abnormalities:  No persistent asymmetries of voltage or frequency.  Interictal Activity:  None  Focal Slowing:  None  Generalized Slowing:  Mild  Events:  1)	No electrographic seizures or significant clinical events.  Provocations:  1)	Hyperventilation: was not performed.  2)	Photic stimulation: was not performed.  Impression:  Abnormal due to generalized slowing as above    Clinical Correlation:  Findings consistent with mild diffuse electrocerebral dysfunction secondary to nonspecific etiology    Miguel Angel Grimes MD  Attending Neurologist, Division of Epilepsy

## 2024-12-19 NOTE — PROGRESS NOTE ADULT - ASSESSMENT
79 year old female past medical history of  HTN, HLD presenting for a fall down a few steps 1 week ago and visual hallucinations. She was evaluated after the fall and was found to have a fracture in the right wrist and shoulder. She was placed in a sling, but has been unable to tolerate splint so family member removed it.     #Visual hallucinations  - Possible related to daily alcohol use.   - Pt denies any current hallucinations  - Monitor for alcohol withdrawal, CIWA protocol  - Monitor for further episodes inpt, Psych eval if visual hallucinations recur  - fu MR NC    #Right proximal humerus fracture  #Right distal radius fracture  Ortho eval noted  - Sling placed  - Non-operative at this point in time  - PT/OT eval    #HTN/HLD  On lasix for chronic LE edema  - Cont lasix 20 TTS  - Cont crestor    #Hx of DVT  - Cont Eliquis    #Anxiety/depression  - Continue home meds    DVT PPX, Eliquis    #Progress Note Handoff  Pending (specify): fu MR Head, PT/OT, monitor for further hallucinations  Family discussion: kizzy pt regarding plan of care  Disposition: F, from home . 79 year old female past medical history of  HTN, HLD presenting for a fall down a few steps 1 week ago and visual hallucinations. She was evaluated after the fall and was found to have a fracture in the right wrist and shoulder. She was placed in a sling, but has been unable to tolerate splint so family member removed it.     #Visual hallucinations  - Possible related to daily alcohol use.   - Pt denies any current hallucinations  - Monitor for alcohol withdrawal, CIWA protocol  - Monitor for further episodes inpt, Psych eval if visual hallucinations recur  - MR    #Right proximal humerus fracture  #Right distal radius fracture  Ortho eval noted  - Sling placed  - Non-operative at this point in time  - PT/OT eval     #HTN/HLD  On lasix for chronic LE edema  - Cont lasix 20 TTS  - Cont crestor    #Hx of DVT  - Cont Eliquis    #Anxiety/depression  - Continue home meds    DVT PPX, Eliquis    #Progress Note Handoff  Pending (specify): fu MR Head, PT/OT, monitor for further hallucinations  Family discussion: kizzy pt regarding plan of care  Disposition: F, from home .

## 2024-12-19 NOTE — PROGRESS NOTE ADULT - SUBJECTIVE AND OBJECTIVE BOX
HPI:  79 year old female past medical history of  HTN, HLD, ?Afib on Eliquis presenting for a fall down a few steps 1 week ago. She was evaluated after the fall and was found to have a fracture in the right wrist and shoulder. She was placed in a sling, but has been unable to tolerate splint so family member removed it.     Patient is sedentary at baseline and more so since the fall. Also, 3 days ago the patient started having visual hallucinations of family members who have passed away. Patient has good insight that these are just hallucinations per her daughters. Due to it being consistent over the last 3 days her PCP recommended bringing her to the ED for evaluation. Patient has no complaints, has a good appetite, no new injury however has been consistently using her right arm despite the injury. She has also been weaker in her legs, unable to get to the bathroom and having significant urinary incontinence. Patient also endorses daily alcohol use with 1-3 glassed of wine/drinks per day.     In the ED vital signs were /80, HR 80, Afebrile, 96% on RA. CT head showed chronic microvascular changes. CT chest/abdomen/pelvis showed partially imaged acute impacted and displaced right humerus surgical neck fracture with mild surrounding soft tissue/muscular hematoma with stranding around the right pectoralis major possibly related to superficial contusion or extension from the shoulder injury. Also showed age indeterminate (new since lumbar spine MRI 8/1/2019) though probably chronic appearing moderate to severe L2, moderate L3 and mild L4 compression deformities. Unchanged chronic severe T12 compression with similar superior corner retropulsion. Labs significant only for a hemoglobin of 10.5.       Vital Signs Last 24 Hrs  T(C): 37.1 (18 Dec 2024 07:56), Max: 37.8 (17 Dec 2024 18:43)  T(F): 98.7 (18 Dec 2024 07:56), Max: 100 (17 Dec 2024 18:43)  HR: 90 (18 Dec 2024 07:56) (80 - 90)  BP: 166/91 (18 Dec 2024 07:56) (123/80 - 166/91)  BP(mean): --  RR: 18 (18 Dec 2024 07:56) (18 - 18)  SpO2: 95% (18 Dec 2024 07:56) (95% - 96%)    Parameters below as of 18 Dec 2024 07:56  Patient On (Oxygen Delivery Method): room air     (18 Dec 2024 09:48)       Today is hospital day 2d.     PAST MEDICAL & SURGICAL HISTORY  HTN (hypertension)    OP (osteoporosis)    High cholesterol    Depression    S/P cholecystectomy        SOCIAL HISTORY:  Social History:      Otherwise, as noted in HPI    ALLERGIES:  penicillins (Swelling)      MEDICATIONS:  STANDING MEDICATIONS  apixaban 2.5 milliGRAM(s) Oral every 12 hours  folic acid 1 milliGRAM(s) Oral daily  furosemide    Tablet 20 milliGRAM(s) Oral <User Schedule>  rosuvastatin 40 milliGRAM(s) Oral at bedtime  sertraline 100 milliGRAM(s) Oral daily  sertraline 25 milliGRAM(s) Oral at bedtime  thiamine IVPB 500 milliGRAM(s) IV Intermittent daily    PRN MEDICATIONS  acetaminophen     Tablet .. 650 milliGRAM(s) Oral every 6 hours PRN  aluminum hydroxide/magnesium hydroxide/simethicone Suspension 30 milliLiter(s) Oral every 4 hours PRN  melatonin 3 milliGRAM(s) Oral at bedtime PRN  ondansetron Injectable 4 milliGRAM(s) IV Push every 8 hours PRN      VITALS:   T(C): 36.4 (12-19-24 @ 08:04), Max: 37.8 (12-18-24 @ 16:03)  HR: 72 (12-19-24 @ 08:04) (72 - 87)  BP: 162/75 (12-19-24 @ 08:04) (135/77 - 179/96)  RR: 18 (12-19-24 @ 08:04) (18 - 20)  SpO2: 98% (12-19-24 @ 08:04) (92% - 98%)  I&O's Summary    18 Dec 2024 07:01  -  19 Dec 2024 07:00  --------------------------------------------------------  IN: 0 mL / OUT: 950 mL / NET: -950 mL    19 Dec 2024 07:01  -  19 Dec 2024 11:50  --------------------------------------------------------  IN: 360 mL / OUT: 0 mL / NET: 360 mL          PHYSICAL EXAM:  GENERAL: Nondistressed  HEAD: Atraumatic  EYES: Conjunctiva and sclera clear  LUNG: B/L good air entry  HEART: S1 S2 heard  ABDOMEN: Soft, Nontender, Nondistended; Bowel sounds present  EXTREMITIES: Rt arm wrapped and in sling      LABS:                        10.1   7.01  )-----------( 267      ( 19 Dec 2024 07:45 )             31.9     12-19    143  |  108  |  28[H]  ----------------------------<  93  4.1   |  25  |  1.0    Ca    10.1      19 Dec 2024 07:45  Mg     1.9     12-18    TPro  5.7[L]  /  Alb  3.2[L]  /  TBili  0.6  /  DBili  x   /  AST  22  /  ALT  19  /  AlkPhos  91  12-19    PT/INR - ( 18 Dec 2024 07:21 )   PT: 12.40 sec;   INR: 1.05 ratio         PTT - ( 18 Dec 2024 07:21 )  PTT:29.3 sec  Urinalysis Basic - ( 19 Dec 2024 07:45 )    Color: x / Appearance: x / SG: x / pH: x  Gluc: 93 mg/dL / Ketone: x  / Bili: x / Urobili: x   Blood: x / Protein: x / Nitrite: x   Leuk Esterase: x / RBC: x / WBC x   Sq Epi: x / Non Sq Epi: x / Bacteria: x            Urinalysis with Rflx Culture (collected 18 Dec 2024 13:51)

## 2024-12-19 NOTE — PHYSICAL THERAPY INITIAL EVALUATION ADULT - ADDITIONAL COMMENTS
Patient lives with daughter in the 3rd floor walk-up coop. Was assisted in ADLs by daughter, independent in home ambulation no AD.

## 2024-12-19 NOTE — PHYSICAL THERAPY INITIAL EVALUATION ADULT - PERTINENT HX OF CURRENT PROBLEM, REHAB EVAL
79 year old female past medical history of  HTN, HLD, ?Afib on Eliquis presenting for a fall down a few steps 1 week ago. She was evaluated after the fall and was found to have a fracture in the right wrist and shoulder. She was placed in a sling, but has been unable to tolerate splint so family member removed it.   Patient is sedentary at baseline and more so since the fall. Also, 3 days ago the patient started having visual hallucinations of family members who have passed away. Patient has good insight that these are just hallucinations per her daughters. Due to it being consistent over the last 3 days her PCP recommended bringing her to the ED for evaluation. Patient has no complaints, has a good appetite, no new injury however has been consistently using her right arm despite the injury. She has also been weaker in her legs, unable to get to the bathroom and having significant urinary incontinence. Patient also endorses daily alcohol use with 1-3 glassed of wine/drinks per day.

## 2024-12-19 NOTE — OCCUPATIONAL THERAPY INITIAL EVALUATION ADULT - ADDITIONAL COMMENTS
Pt resides in multilevel co-op with adult daughter, + stairs, + bathtub with grab bar, does not drive, daughter assists with community access/ MD appt and IADLs. has RW and cane however not using devices

## 2024-12-19 NOTE — OCCUPATIONAL THERAPY INITIAL EVALUATION ADULT - PLANNED THERAPY INTERVENTIONS, OT EVAL
ADL retraining/balance training/bed mobility training/cognitive, visual perceptual/fine motor coordination training/parent/caregiver training.../ROM/strengthening/stretching/transfer training

## 2024-12-20 LAB
ALBUMIN SERPL ELPH-MCNC: 2.9 G/DL — LOW (ref 3.5–5.2)
ALP SERPL-CCNC: 93 U/L — SIGNIFICANT CHANGE UP (ref 30–115)
ALT FLD-CCNC: 16 U/L — SIGNIFICANT CHANGE UP (ref 0–41)
ANION GAP SERPL CALC-SCNC: 9 MMOL/L — SIGNIFICANT CHANGE UP (ref 7–14)
AST SERPL-CCNC: 18 U/L — SIGNIFICANT CHANGE UP (ref 0–41)
BASOPHILS # BLD AUTO: 0.03 K/UL — SIGNIFICANT CHANGE UP (ref 0–0.2)
BASOPHILS NFR BLD AUTO: 0.4 % — SIGNIFICANT CHANGE UP (ref 0–1)
BILIRUB SERPL-MCNC: 0.4 MG/DL — SIGNIFICANT CHANGE UP (ref 0.2–1.2)
BUN SERPL-MCNC: 31 MG/DL — HIGH (ref 10–20)
CALCIUM SERPL-MCNC: 9.6 MG/DL — SIGNIFICANT CHANGE UP (ref 8.4–10.5)
CHLORIDE SERPL-SCNC: 108 MMOL/L — SIGNIFICANT CHANGE UP (ref 98–110)
CO2 SERPL-SCNC: 25 MMOL/L — SIGNIFICANT CHANGE UP (ref 17–32)
CREAT SERPL-MCNC: 1.1 MG/DL — SIGNIFICANT CHANGE UP (ref 0.7–1.5)
EGFR: 50 ML/MIN/1.73M2 — LOW
EOSINOPHIL # BLD AUTO: 0.19 K/UL — SIGNIFICANT CHANGE UP (ref 0–0.7)
EOSINOPHIL NFR BLD AUTO: 2.7 % — SIGNIFICANT CHANGE UP (ref 0–8)
GLUCOSE SERPL-MCNC: 94 MG/DL — SIGNIFICANT CHANGE UP (ref 70–99)
HCT VFR BLD CALC: 30.4 % — LOW (ref 37–47)
HGB BLD-MCNC: 9.6 G/DL — LOW (ref 12–16)
IMM GRANULOCYTES NFR BLD AUTO: 0.9 % — HIGH (ref 0.1–0.3)
LYMPHOCYTES # BLD AUTO: 1.2 K/UL — SIGNIFICANT CHANGE UP (ref 1.2–3.4)
LYMPHOCYTES # BLD AUTO: 17.1 % — LOW (ref 20.5–51.1)
MCHC RBC-ENTMCNC: 27 PG — SIGNIFICANT CHANGE UP (ref 27–31)
MCHC RBC-ENTMCNC: 31.6 G/DL — LOW (ref 32–37)
MCV RBC AUTO: 85.6 FL — SIGNIFICANT CHANGE UP (ref 81–99)
MONOCYTES # BLD AUTO: 0.67 K/UL — HIGH (ref 0.1–0.6)
MONOCYTES NFR BLD AUTO: 9.5 % — HIGH (ref 1.7–9.3)
NEUTROPHILS # BLD AUTO: 4.88 K/UL — SIGNIFICANT CHANGE UP (ref 1.4–6.5)
NEUTROPHILS NFR BLD AUTO: 69.4 % — SIGNIFICANT CHANGE UP (ref 42.2–75.2)
NRBC # BLD: 0 /100 WBCS — SIGNIFICANT CHANGE UP (ref 0–0)
PLATELET # BLD AUTO: 252 K/UL — SIGNIFICANT CHANGE UP (ref 130–400)
PMV BLD: 9.9 FL — SIGNIFICANT CHANGE UP (ref 7.4–10.4)
POTASSIUM SERPL-MCNC: 4.3 MMOL/L — SIGNIFICANT CHANGE UP (ref 3.5–5)
POTASSIUM SERPL-SCNC: 4.3 MMOL/L — SIGNIFICANT CHANGE UP (ref 3.5–5)
PROT SERPL-MCNC: 4.9 G/DL — LOW (ref 6–8)
RBC # BLD: 3.55 M/UL — LOW (ref 4.2–5.4)
RBC # FLD: 14.3 % — SIGNIFICANT CHANGE UP (ref 11.5–14.5)
SODIUM SERPL-SCNC: 142 MMOL/L — SIGNIFICANT CHANGE UP (ref 135–146)
WBC # BLD: 7.03 K/UL — SIGNIFICANT CHANGE UP (ref 4.8–10.8)
WBC # FLD AUTO: 7.03 K/UL — SIGNIFICANT CHANGE UP (ref 4.8–10.8)

## 2024-12-20 PROCEDURE — 99232 SBSQ HOSP IP/OBS MODERATE 35: CPT

## 2024-12-20 RX ORDER — THIAMINE HYDROCHLORIDE 100 MG/ML
100 INJECTION, SOLUTION INTRAMUSCULAR; INTRAVENOUS EVERY 24 HOURS
Refills: 0 | Status: DISCONTINUED | OUTPATIENT
Start: 2024-12-20 | End: 2024-12-26

## 2024-12-20 RX ADMIN — ACETAMINOPHEN 650 MILLIGRAM(S): 80 SOLUTION/ DROPS ORAL at 06:56

## 2024-12-20 RX ADMIN — SERTRALINE HYDROCHLORIDE 25 MILLIGRAM(S): 25 TABLET ORAL at 21:31

## 2024-12-20 RX ADMIN — THIAMINE HYDROCHLORIDE 100 MILLIGRAM(S): 100 INJECTION, SOLUTION INTRAMUSCULAR; INTRAVENOUS at 11:01

## 2024-12-20 RX ADMIN — ROSUVASTATIN 40 MILLIGRAM(S): 40 TABLET, FILM COATED ORAL at 21:31

## 2024-12-20 RX ADMIN — APIXABAN 2.5 MILLIGRAM(S): 5 TABLET, FILM COATED ORAL at 06:56

## 2024-12-20 RX ADMIN — SERTRALINE HYDROCHLORIDE 100 MILLIGRAM(S): 25 TABLET ORAL at 11:01

## 2024-12-20 RX ADMIN — Medication 1 MILLIGRAM(S): at 11:01

## 2024-12-20 RX ADMIN — APIXABAN 2.5 MILLIGRAM(S): 5 TABLET, FILM COATED ORAL at 17:26

## 2024-12-20 NOTE — CHART NOTE - NSCHARTNOTEFT_GEN_A_CORE
Consult received for "MST Score = />2." Upon chart review, patient with stable weight trends and reported good appetite. Does not currently meet criteria for Protein Calorie Malnutrition risk per MST.     RD remains available for assessment per protocol or as needed.    Dawn Bustos MS, CLC RDN   [phone] or via Teams

## 2024-12-20 NOTE — PROGRESS NOTE ADULT - ASSESSMENT
79 year old female past medical history of  HTN, HLD presenting for a fall down a few steps 1 week ago and visual hallucinations. She was evaluated after the fall and was found to have a fracture in the right wrist and shoulder. She was placed in a sling, but has been unable to tolerate splint so family member removed it.     #Visual hallucinations - resolved   - suspected related daily alcohol use. Pt states hallucinations have stopped since admission   - patient states she drink 2-3 glasses of wine daily for the past 20 years   - Monitor for alcohol withdrawal symptoms   - REEG - generalized slowing     #acute urinary retention - resolved  - patient passed TOV and voiding freely     #Right proximal humerus fracture and Right distal radius fracture s/p fall  - Ortho eval appreciated   - continue pain control and sling  - PT/OT eval appreciated - SNF recommended - discussed with case management    #HTN/HLD  #Chronic LE edema - on lasix   - continue statin     #Hx of DVT  - Cont Eliquis    #Anxiety/depression  - Cont sertraline 100mg po in the morning and 25mg po qhs  - On bupropion 75 qD at home - resume today     Progress Note Handoff  Pending Consults: CM  Pending Tests: none  Pending Results: none  Family Discussion: Discussed labs, meds, and dc planning to SNF with pt and medical staff. PFD placed for 24hrs. Patient needs auth - discussed with CM  Disposition: Home_____/SNF__x____/Other_____/Unknown at this time_____  Spent over 55 min reviewing chart, speaking with patient/family and on coordinating patient care during interdisciplinary rounds     Please call me with any questions at extension [phone]

## 2024-12-20 NOTE — PROGRESS NOTE ADULT - ASSESSMENT
79 year old female past medical history of  HTN, HLD presenting for a fall down a few steps 1 week ago and visual hallucinations. She was evaluated after the fall and was found to have a fracture in the right wrist and shoulder. She was placed in a sling, but has been unable to tolerate splint so family member removed it.       #Right proximal humerus fracture  #Right distal radius fracture  Ortho eval noted  - Sling placed  - Non-operative at this point in time  - PT/OT eval: Recommend Rehab facility    #Visual hallucinations-resolved  - Possible related to daily alcohol use.   - Pt denies any current hallucinations  - Monitor for further episodes inpt, Psych eval if visual hallucinations recur  - MR Head neg for acute intracranial    #HTN/HLD  On lasix for chronic LE edema  - Cont lasix 20 TTS  - Cont crestor    #Hx of DVT  - Cont Eliquis    #Anxiety/depression  - Continue home meds    -DVT prophylaxis: Eliquis  -GI prophylaxis: Not indicated  -Diet: DASH/CC  -Code status: Full code  -Activity: AAT  -Dispo: Rehab facility

## 2024-12-20 NOTE — PROGRESS NOTE ADULT - SUBJECTIVE AND OBJECTIVE BOX
MOSQUERA MARVIJAY  79y  Female      Patient is a 79y old  Female who presents with a Fall (19 Dec 2024 11:50)      INTERVAL HPI/OVERNIGHT EVENTS:  Patient seen and examined earlier this morning  lying comfortably in bed  in nad  no complaints     T(C): 36.7 (12-20-24 @ 08:25), Max: 36.8 (12-19-24 @ 16:17)  HR: 71 (12-20-24 @ 08:25) (71 - 78)  BP: 124/67 (12-20-24 @ 08:25) (124/67 - 131/73)  RR: 18 (12-20-24 @ 08:25) (18 - 18)  SpO2: 94% (12-20-24 @ 08:25) (94% - 99%)    PHYSICAL EXAM:  GENERAL: NAD, well-groomed,   HEAD:  Atraumatic, Normocephalic  EYES: conjunctiva and sclera clear  ENMT: Moist mucous membranes,  No visible lesions  NECK: Supple, No JVD, Normal thyroid  NERVOUS SYSTEM:  Alert & Oriented X2, Good concentration; moves all extremities   CHEST/LUNG: good air entry   HEART: Regular rate and rhythm; No murmurs, rubs, or gallops  ABDOMEN: Soft, Nontender, Nondistended; Bowel sounds present, obese   EXTREMITIES:  2+ Peripheral Pulses, No clubbing, cyanosis, or edema; right arm in sling   LYMPH: No lymphadenopathy noted  SKIN: No rashes or lesions    Consultant(s) Notes Reviewed:  [x ] YES  [ ] NO  Care Discussed with Consultants/Other Providers [ x] YES  [ ] NO    LAB:                        9.6    7.03  )-----------( 252      ( 20 Dec 2024 08:02 )             30.4     12-20    142  |  108  |  31[H]  ----------------------------<  94  4.3   |  25  |  1.1    Ca    9.6      20 Dec 2024 08:02    TPro  4.9[L]  /  Alb  2.9[L]  /  TBili  0.4  /  DBili  x   /  AST  18  /  ALT  16  /  AlkPhos  93  12-20    LIVER FUNCTIONS - ( 20 Dec 2024 08:02 )  Alb: 2.9 g/dL / Pro: 4.9 g/dL / ALK PHOS: 93 U/L / ALT: 16 U/L / AST: 18 U/L / GGT: x             Drug Dosing Weight  Height (cm): 152.4 (17 Dec 2024 18:43)  Weight (kg): 81.6 (17 Dec 2024 18:43)  BMI (kg/m2): 35.1 (17 Dec 2024 18:43)  BSA (m2): 1.78 (17 Dec 2024 18:43)      I&O's Summary    19 Dec 2024 07:01  -  20 Dec 2024 07:00  --------------------------------------------------------  IN: 600 mL / OUT: 400 mL / NET: 200 mL      Urinalysis Basic - ( 20 Dec 2024 08:02 )    Color: x / Appearance: x / SG: x / pH: x  Gluc: 94 mg/dL / Ketone: x  / Bili: x / Urobili: x   Blood: x / Protein: x / Nitrite: x   Leuk Esterase: x / RBC: x / WBC x   Sq Epi: x / Non Sq Epi: x / Bacteria: x        RADIOLOGY & ADDITIONAL TESTS:  Imaging Personally Reviewed:  [x] YES  [ ] NO      MEDS:  acetaminophen     Tablet .. 650 milliGRAM(s) Oral every 6 hours PRN  aluminum hydroxide/magnesium hydroxide/simethicone Suspension 30 milliLiter(s) Oral every 4 hours PRN  apixaban 2.5 milliGRAM(s) Oral every 12 hours  folic acid 1 milliGRAM(s) Oral daily  furosemide    Tablet 20 milliGRAM(s) Oral <User Schedule>  melatonin 3 milliGRAM(s) Oral at bedtime PRN  ondansetron Injectable 4 milliGRAM(s) IV Push every 8 hours PRN  rosuvastatin 40 milliGRAM(s) Oral at bedtime  sertraline 100 milliGRAM(s) Oral daily  sertraline 25 milliGRAM(s) Oral at bedtime  thiamine 100 milliGRAM(s) Oral every 24 hours

## 2024-12-20 NOTE — PROGRESS NOTE ADULT - SUBJECTIVE AND OBJECTIVE BOX
SUBJECTIVE/OVERNIGHT EVENTS  Today is hospital day 3d. This morning patient was seen and examined at bedside, resting comfortably in bed. No acute or major events overnight.    HOSPITAL COURSE  Day 1:   Day 2:   Day 3:     CODE STATUS:    FAMILY COMMUNICATION  Contact date:  Name of person contacted:  Relationship to patient:  Communication details:    MEDICATIONS  STANDING MEDICATIONS  apixaban 2.5 milliGRAM(s) Oral every 12 hours  folic acid 1 milliGRAM(s) Oral daily  furosemide    Tablet 20 milliGRAM(s) Oral <User Schedule>  rosuvastatin 40 milliGRAM(s) Oral at bedtime  sertraline 100 milliGRAM(s) Oral daily  sertraline 25 milliGRAM(s) Oral at bedtime  thiamine 100 milliGRAM(s) Oral every 24 hours    PRN MEDICATIONS  acetaminophen     Tablet .. 650 milliGRAM(s) Oral every 6 hours PRN  aluminum hydroxide/magnesium hydroxide/simethicone Suspension 30 milliLiter(s) Oral every 4 hours PRN  melatonin 3 milliGRAM(s) Oral at bedtime PRN  ondansetron Injectable 4 milliGRAM(s) IV Push every 8 hours PRN    VITALS  T(F): 98.1 (12-20-24 @ 08:25), Max: 98.2 (12-19-24 @ 16:17)  HR: 71 (12-20-24 @ 08:25) (71 - 78)  BP: 124/67 (12-20-24 @ 08:25) (124/67 - 131/73)  RR: 18 (12-20-24 @ 08:25) (18 - 18)  SpO2: 94% (12-20-24 @ 08:25) (94% - 99%)    PHYSICAL EXAM  CONSTITUTIONAL: Well groomed, no apparent distress  EYES: EOMI, No conjunctival or scleral injection, non-icteric  ENMT: Oral mucosa with moist membranes.  NECK: Supple, symmetric  RESP: No respiratory distress, no use of accessory muscles; CTA b/l, no WRR  CV: RRR, +S1S2 no JVD; no peripheral edema  GI: Soft, NT, ND, no rebound, no guarding; no palpable masses  NEURO: Grossly intact, no focal deficits  PSYCH: Appropriate insight/judgment; A+O x 3, mood and affect appropriate, recent/remote memory intact; denies visual or auditory hallucinations    (  ) Indwelling Luther Catheter   Date insterted:    Reason (  ) Critical illness     (  ) urinary retention    (  ) Accurate Ins/Outs Monitoring     (  ) CMO patient    (  ) Central Line  Date inserted:  Location: (  ) Right IJ   (  ) Left IJ   (  ) Right Fem   (  ) Left Fem    (  ) SPC  (  ) pigtail  (  ) PEG tube  (  ) colostomy  (  ) jejunostomy  (  ) U-Dall    LABS             9.6    7.03  )-----------( 252      ( 12-20-24 @ 08:02 )             30.4     142  |  108  |  31  -------------------------<  94   12-20-24 @ 08:02  4.3  |  25  |  1.1    Ca      9.6     12-20-24 @ 08:02    TPro  4.9  /  Alb  2.9  /  TBili  0.4  /  DBili  x   /  AST  18  /  ALT  16  /  AlkPhos  93  /  GGT  x     12-20-24 @ 08:02        Urinalysis Basic - ( 20 Dec 2024 08:02 )    Color: x / Appearance: x / SG: x / pH: x  Gluc: 94 mg/dL / Ketone: x  / Bili: x / Urobili: x   Blood: x / Protein: x / Nitrite: x   Leuk Esterase: x / RBC: x / WBC x   Sq Epi: x / Non Sq Epi: x / Bacteria: x          Urinalysis with Rflx Culture (collected 18 Dec 2024 13:51)      IMAGING

## 2024-12-21 LAB
BASOPHILS # BLD AUTO: 0.03 K/UL — SIGNIFICANT CHANGE UP (ref 0–0.2)
BASOPHILS NFR BLD AUTO: 0.3 % — SIGNIFICANT CHANGE UP (ref 0–1)
EOSINOPHIL # BLD AUTO: 0.19 K/UL — SIGNIFICANT CHANGE UP (ref 0–0.7)
EOSINOPHIL NFR BLD AUTO: 2.1 % — SIGNIFICANT CHANGE UP (ref 0–8)
HCT VFR BLD CALC: 31.6 % — LOW (ref 37–47)
HGB BLD-MCNC: 9.9 G/DL — LOW (ref 12–16)
IMM GRANULOCYTES NFR BLD AUTO: 1 % — HIGH (ref 0.1–0.3)
LYMPHOCYTES # BLD AUTO: 1.27 K/UL — SIGNIFICANT CHANGE UP (ref 1.2–3.4)
LYMPHOCYTES # BLD AUTO: 13.9 % — LOW (ref 20.5–51.1)
MCHC RBC-ENTMCNC: 26.5 PG — LOW (ref 27–31)
MCHC RBC-ENTMCNC: 31.3 G/DL — LOW (ref 32–37)
MCV RBC AUTO: 84.7 FL — SIGNIFICANT CHANGE UP (ref 81–99)
MONOCYTES # BLD AUTO: 0.83 K/UL — HIGH (ref 0.1–0.6)
MONOCYTES NFR BLD AUTO: 9.1 % — SIGNIFICANT CHANGE UP (ref 1.7–9.3)
NEUTROPHILS # BLD AUTO: 6.73 K/UL — HIGH (ref 1.4–6.5)
NEUTROPHILS NFR BLD AUTO: 73.6 % — SIGNIFICANT CHANGE UP (ref 42.2–75.2)
NRBC # BLD: 0 /100 WBCS — SIGNIFICANT CHANGE UP (ref 0–0)
PLATELET # BLD AUTO: 265 K/UL — SIGNIFICANT CHANGE UP (ref 130–400)
PMV BLD: 10 FL — SIGNIFICANT CHANGE UP (ref 7.4–10.4)
RBC # BLD: 3.73 M/UL — LOW (ref 4.2–5.4)
RBC # FLD: 14.4 % — SIGNIFICANT CHANGE UP (ref 11.5–14.5)
WBC # BLD: 9.14 K/UL — SIGNIFICANT CHANGE UP (ref 4.8–10.8)
WBC # FLD AUTO: 9.14 K/UL — SIGNIFICANT CHANGE UP (ref 4.8–10.8)

## 2024-12-21 PROCEDURE — 99232 SBSQ HOSP IP/OBS MODERATE 35: CPT

## 2024-12-21 RX ORDER — VITAMIN A 10000 UNIT
1 TABLET ORAL
Qty: 0 | Refills: 0 | DISCHARGE
Start: 2024-12-21

## 2024-12-21 RX ORDER — THIAMINE HYDROCHLORIDE 100 MG/ML
1 INJECTION, SOLUTION INTRAMUSCULAR; INTRAVENOUS
Qty: 0 | Refills: 0 | DISCHARGE
Start: 2024-12-21

## 2024-12-21 RX ADMIN — ROSUVASTATIN 40 MILLIGRAM(S): 40 TABLET, FILM COATED ORAL at 21:49

## 2024-12-21 RX ADMIN — APIXABAN 2.5 MILLIGRAM(S): 5 TABLET, FILM COATED ORAL at 17:38

## 2024-12-21 RX ADMIN — SERTRALINE HYDROCHLORIDE 25 MILLIGRAM(S): 25 TABLET ORAL at 21:49

## 2024-12-21 RX ADMIN — APIXABAN 2.5 MILLIGRAM(S): 5 TABLET, FILM COATED ORAL at 05:34

## 2024-12-21 RX ADMIN — SERTRALINE HYDROCHLORIDE 100 MILLIGRAM(S): 25 TABLET ORAL at 11:23

## 2024-12-21 RX ADMIN — Medication 20 MILLIGRAM(S): at 05:33

## 2024-12-21 RX ADMIN — Medication 1 MILLIGRAM(S): at 11:23

## 2024-12-21 RX ADMIN — THIAMINE HYDROCHLORIDE 100 MILLIGRAM(S): 100 INJECTION, SOLUTION INTRAMUSCULAR; INTRAVENOUS at 11:23

## 2024-12-21 NOTE — PROGRESS NOTE ADULT - ASSESSMENT
79 year old female past medical history of  HTN, HLD presenting for a fall down a few steps 1 week ago and visual hallucinations. She was evaluated after the fall and was found to have a fracture in the right wrist and shoulder. She was placed in a sling, but has been unable to tolerate splint so family member removed it.     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 Pt states hallucinations have stopped since admission   - patient states she drink 2-3 glasses of wine daily for the past 20 years   - Monitor for alcohol withdrawal symptoms   - REEG - generalized slowing     #acute urinary retention - resolved  - patient passed TOV and voiding freely     #Right proximal humerus fracture and Right distal radius fracture s/p fall  - Ortho eval appreciated   - continue pain control and sling  - PT/OT eval appreciated - SNF recommended - discussed with case management    #HTN/HLD  #Chronic LE edema - on lasix   - continue statin     #Hx of DVT  - Cont Eliquis    #Anxiety/depression  - Cont sertraline 100mg po in the morning and 25mg po qhs  - On bupropion 75 qD at home - resume today     Progress Note Handoff  Pending Consults: STR Placement

## 2024-12-21 NOTE — DISCHARGE NOTE PROVIDER - CARE PROVIDER_API CALL
Christopher Ramos  Internal Medicine  [address] [place] [id number]  Phone: [phone]  Fax: [phone]  Established Patient  Follow Up Time: 1 week   Christopher Ramos  Internal Medicine  [facility] [place] [address]  Phone: [phone]  Fax: [phone]  Established Patient  Follow Up Time: 1 week    Vidal Lang  Orthopaedic Surgery  [address] [place] [id number]  Phone: [phone]  Fax: [phone]  Follow Up Time:    Christopher Ramos  Internal Medicine  [facility] [place] [address]  Phone: [phone]  Fax: [phone]  Established Patient  Follow Up Time: 1 week    Vidal Lang  Orthopaedic Surgery  [address] [place] [id number]  Phone: [phone]  Fax: [phone]  Follow Up Time: 1 week

## 2024-12-21 NOTE — DISCHARGE NOTE PROVIDER - PROVIDER TOKENS
PROVIDER:[TOKEN:[[id number]],FOLLOWUP:[1 week],ESTABLISHEDPATIENT:[T]] PROVIDER:[TOKEN:[[id number]],FOLLOWUP:[1 week],ESTABLISHEDPATIENT:[T]],PROVIDER:[TOKEN:[[id number]]] PROVIDER:[TOKEN:[[id number]],FOLLOWUP:[1 week],ESTABLISHEDPATIENT:[T]],PROVIDER:[TOKEN:[[id number]],FOLLOWUP:[1 week]]

## 2024-12-21 NOTE — DISCHARGE NOTE PROVIDER - ATTENDING ATTESTATION STATEMENT
"TC with pt - shared that the [facility] returned with \"yes\" to benefit of continuation of endocrine therapy for prevention of the breast cancer. Pt stated understanding and will continue on with Tamoxifen - she has a return to clinic set for next October. EITAN Willis  " I have personally seen and examined the patient. I have collaborated with and supervised the

## 2024-12-21 NOTE — DISCHARGE NOTE PROVIDER - NSDCCPCAREPLAN_GEN_ALL_CORE_FT
PRINCIPAL DISCHARGE DIAGNOSIS  Diagnosis: Altered mental status  Assessment and Plan of Treatment: you had a period of change in mental status that can be related to alcohol consumption. You are currently at baseline. Please try to limit alcohol use to moderation. Please follow up with your PCP within 2 wks of discharge.      SECONDARY DISCHARGE DIAGNOSES  Diagnosis: Visual hallucinations  Assessment and Plan of Treatment:     Diagnosis: Other displaced fracture of proximal end of right humerus  Assessment and Plan of Treatment:      PRINCIPAL DISCHARGE DIAGNOSIS  Diagnosis: Altered mental status  Assessment and Plan of Treatment: You had a period of change in mental status that can be related to alcohol consumption. You are currently at baseline. Please try to limit alcohol use to moderation. Please follow up with your PCP within 2 wks of discharge.      SECONDARY DISCHARGE DIAGNOSES  Diagnosis: Visual hallucinations  Assessment and Plan of Treatment:     Diagnosis: Other displaced fracture of proximal end of right humerus  Assessment and Plan of Treatment: You had a fall and on xray you were found to have a fracture in your right wrist and shoulder. We placed in you a sling and you saw the orthopedic team while admitted. They recommended outpatient with  in 2-3 weeks, please call [phone] for an appointment

## 2024-12-21 NOTE — DISCHARGE NOTE PROVIDER - NSDCMRMEDTOKEN_GEN_ALL_CORE_FT
buPROPion 75 mg oral tablet: 1 tab(s) orally once a day  Eliquis 2.5 mg oral tablet: 1 tab(s) orally 2 times a day  folic acid 1 mg oral tablet: 1 tab(s) orally once a day  furosemide 20 mg oral tablet: 1 tab(s) orally every other day  rosuvastatin 40 mg oral tablet: 1 tab(s) orally once a day  sertraline 100 mg oral tablet: 1 tab(s) orally once a day  sertraline 25 mg oral tablet: 1 tab(s) orally once a day (at bedtime)  thiamine 100 mg oral tablet: 1 tab(s) orally every 24 hours   buPROPion 75 mg oral tablet: 1 tab(s) orally once a day  Eliquis 2.5 mg oral tablet: 1 tab(s) orally 2 times a day  folic acid 1 mg oral tablet: 1 tab(s) orally once a day  furosemide 20 mg oral tablet: 1 tab(s) orally every other day  rosuvastatin 40 mg oral tablet: 1 tab(s) orally once a day  sertraline 100 mg oral tablet: 1 tab(s) orally once a day  sertraline 25 mg oral tablet: 1 tab(s) orally once a day (at bedtime)  thiamine 100 mg oral tablet: 1 tab(s) orally every 24 hours  valsartan 80 mg oral tablet: 1 tab(s) orally once a day

## 2024-12-21 NOTE — DISCHARGE NOTE PROVIDER - HOSPITAL COURSE
79 year old female past medical history of  HTN, HLD, ?Afib on Eliquis presenting for a fall down a few steps 1 week ago. She was evaluated after the fall and was found to have a fracture in the right wrist and shoulder. She was placed in a sling, but has been unable to tolerate splint so family member removed it.     Patient is sedentary at baseline and more so since the fall. Also, 3 days ago the patient started having visual hallucinations of family members who have passed away. Patient has good insight that these are just hallucinations per her daughters. Due to it being consistent over the last 3 days her PCP recommended bringing her to the ED for evaluation. Patient has no complaints, has a good appetite, no new injury however has been consistently using her right arm despite the injury. She has also been weaker in her legs, unable to get to the bathroom and having significant urinary incontinence. Patient also endorses daily alcohol use with 1-3 glassed of wine/drinks per day.     In the ED vital signs were /80, HR 80, Afebrile, 96% on RA. CT head showed chronic microvascular changes. CT chest/abdomen/pelvis showed partially imaged acute impacted and displaced right humerus surgical neck fracture with mild surrounding soft tissue/muscular hematoma with stranding around the right pectoralis major possibly related to superficial contusion or extension from the shoulder injury. Also showed age indeterminate (new since lumbar spine MRI 8/1/2019) though probably chronic appearing moderate to severe L2, moderate L3 and mild L4 compression deformities. Unchanged chronic severe T12 compression with similar superior corner retropulsion. Labs significant only for a hemoglobin of 10.5.     79 year old female past medical history of  HTN, HLD presenting for a fall down a few steps 1 week ago and visual hallucinations. She was evaluated after the fall and was found to have a fracture in the right wrist and shoulder. She was placed in a sling, but has been unable to tolerate splint so family member removed it.     #Visual hallucinations - resolved   - suspected related daily alcohol use. Pt states hallucinations have stopped since admission   - patient states she drink 2-3 glasses of wine daily for the past 20 years   - Monitor for alcohol withdrawal symptoms   - REEG - generalized slowing     #acute urinary retention - resolved  - patient passed TOV and voiding freely     #Right proximal humerus fracture and Right distal radius fracture s/p fall  - Ortho eval appreciated   - continue pain control and sling  - PT/OT eval appreciated - SNF recommended - discussed with case management    #HTN/HLD  #Chronic LE edema - on lasix   - continue statin     #Hx of DVT  - Cont Eliquis    #Anxiety/depression  - Cont sertraline 100mg po in the morning and 25mg po qhs  - On bupropion 75 qD at home - resume today    79 year old female past medical history of  HTN, HLD, ?Afib on Eliquis presenting for a fall down a few steps 1 week ago. She was evaluated after the fall and was found to have a fracture in the right wrist and shoulder. She was placed in a sling, but has been unable to tolerate splint so family member removed it.     Patient is sedentary at baseline and more so since the fall. Also, 3 days ago the patient started having visual hallucinations of family members who have passed away. Patient has good insight that these are just hallucinations per her daughters. Due to it being consistent over the last 3 days her PCP recommended bringing her to the ED for evaluation. Patient has no complaints, has a good appetite, no new injury however has been consistently using her right arm despite the injury. She has also been weaker in her legs, unable to get to the bathroom and having significant urinary incontinence. Patient also endorses daily alcohol use with 1-3 glassed of wine/drinks per day.     In the ED vital signs were /80, HR 80, Afebrile, 96% on RA. CT head showed chronic microvascular changes. CT chest/abdomen/pelvis showed partially imaged acute impacted and displaced right humerus surgical neck fracture with mild surrounding soft tissue/muscular hematoma with stranding around the right pectoralis major possibly related to superficial contusion or extension from the shoulder injury. Also showed age indeterminate (new since lumbar spine MRI 8/1/2019) though probably chronic appearing moderate to severe L2, moderate L3 and mild L4 compression deformities. Unchanged chronic severe T12 compression with similar superior corner retropulsion. Labs significant only for a hemoglobin of 10.5.     79 year old female past medical history of  HTN, HLD presenting for a fall down a few steps 1 week ago and visual hallucinations. She was evaluated after the fall and was found to have a fracture in the right wrist and shoulder. She was placed in a sling, but has been unable to tolerate splint so family member removed it.     #Visual hallucinations - resolved   - suspected related daily alcohol use. Pt states hallucinations have stopped since admission   - patient states she drink 2-3 glasses of wine daily for the past 20 years   - Monitor for alcohol withdrawal symptoms   - REEG - generalized slowing     #acute urinary retention - resolved  - patient passed TOV and voiding freely     #Right proximal humerus fracture and Right distal radius fracture s/p fall  - Ortho eval appreciated   - continue pain control and sling  - PT/OT eval appreciated - SNF recommended - discussed with case management    #HTN/HLD  #Chronic LE edema - on lasix   - continue statin     #Hx of DVT  - Cont Eliquis    #Anxiety/depression  - Cont sertraline 100mg po in the morning and 25mg po qhs  - On bupropion 75 qD at home - resume today     Discussion of discharge plan of care, including discharge diagnosis, medication reconciliation, and follow-ups, was conducted with Dr. Connolly on 12/24/24 and discharge was approved. 79 year old female past medical history of  HTN, HLD, ?Afib on Eliquis presenting for a fall down a few steps 1 week ago. She was evaluated after the fall and was found to have a fracture in the right wrist and shoulder. She was placed in a sling, but has been unable to tolerate splint so family member removed it.     Patient is sedentary at baseline and more so since the fall. Also, 3 days ago the patient started having visual hallucinations of family members who have passed away. Patient has good insight that these are just hallucinations per her daughters. Due to it being consistent over the last 3 days her PCP recommended bringing her to the ED for evaluation. Patient has no complaints, has a good appetite, no new injury however has been consistently using her right arm despite the injury. She has also been weaker in her legs, unable to get to the bathroom and having significant urinary incontinence. Patient also endorses daily alcohol use with 1-3 glassed of wine/drinks per day.     In the ED vital signs were /80, HR 80, Afebrile, 96% on RA. CT head showed chronic microvascular changes. CT chest/abdomen/pelvis showed partially imaged acute impacted and displaced right humerus surgical neck fracture with mild surrounding soft tissue/muscular hematoma with stranding around the right pectoralis major possibly related to superficial contusion or extension from the shoulder injury. Also showed age indeterminate (new since lumbar spine MRI 8/1/2019) though probably chronic appearing moderate to severe L2, moderate L3 and mild L4 compression deformities. Unchanged chronic severe T12 compression with similar superior corner retropulsion. Labs significant only for a hemoglobin of 10.5.     79 year old female past medical history of  HTN, HLD presenting for a fall down a few steps 1 week ago and visual hallucinations. She was evaluated after the fall and was found to have a fracture in the right wrist and shoulder. She was placed in a sling, but has been unable to tolerate splint so family member removed it.     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 Pt states hallucinations have stopped since admission   - patient states she drink 2-3 glasses of wine daily for the past 20 years   - Monitor for alcohol withdrawal symptoms   - REEG - generalized slowing     #Low grade temp   - asymptomatic, no leukocytosis   - monitor for now     #acute urinary retention - resolved  - patient passed TOV and voiding freely     #Right proximal humerus fracture and Right distal radius fracture s/p fall  - Ortho eval appreciated   - continue pain control and sling  - PT/OT eval appreciated - SNF recommended - discussed with case management  - 12/24 R.hand xray - similar fracture from previous xray, unremarkable  - ortho f/u with dr. Lang outpatient     #HTN - start Valsartan 80mg daily   #Chronic LE edema - on Lasix   - continue statin     #Hx of DVT  - Cont Eliquis    #Anxiety/depression  - Cont sertraline 100mg po in the morning and 25mg po qhs  - On bupropion 75 qD at home - resume today     Discussion of discharge plan of care, including discharge diagnosis, medication reconciliation, and follow-ups, was conducted with Dr. Aguilera on 12/26/24 and discharge was approved.

## 2024-12-21 NOTE — PROGRESS NOTE ADULT - SUBJECTIVE AND OBJECTIVE BOX
Pt seen and examined at bedside.        VITAL SIGNS (Last 24 hrs):  T(C): 37.1 (12-21-24 @ 08:15), Max: 37.1 (12-20-24 @ 16:13)  HR: 74 (12-21-24 @ 08:15) (74 - 87)  BP: 143/76 (12-21-24 @ 08:15) (143/76 - 166/79)  RR: 18 (12-21-24 @ 08:15) (18 - 18)  SpO2: 98% (12-21-24 @ 08:15) (98% - 98%)            I&O's Summary    21 Dec 2024 07:01  -  21 Dec 2024 15:33  --------------------------------------------------------  IN: 0 mL / OUT: 600 mL / NET: -600 mL        PHYSICAL EXAM:  GENERAL: NAD, well-developed  HEAD:  Atraumatic, Normocephalic  EYES: EOMI, PERRLA, conjunctiva and sclera clear  NECK: Supple, No JVD  CHEST/LUNG: Clear to auscultation bilaterally; No wheeze  HEART: Regular rate and rhythm; No murmurs, rubs, or gallops  ABDOMEN: Soft, Nontender, Nondistended; Bowel sounds present  EXTREMITIES:  2+ Peripheral Pulses, No clubbing, cyanosis, or edema  PSYCH: AAOx3  NEUROLOGY: non-focal  SKIN: No rashes or lesions    Labs Reviewed  Spoke to patient in regards to abnormal labs.    CBC Full  -  ( 21 Dec 2024 05:26 )  WBC Count : 9.14 K/uL  Hemoglobin : 9.9 g/dL  Hematocrit : 31.6 %  Platelet Count - Automated : 265 K/uL  Mean Cell Volume : 84.7 fL  Mean Cell Hemoglobin : 26.5 pg  Mean Cell Hemoglobin Concentration : 31.3 g/dL  Auto Neutrophil # : 6.73 K/uL  Auto Lymphocyte # : 1.27 K/uL  Auto Monocyte # : 0.83 K/uL  Auto Eosinophil # : 0.19 K/uL  Auto Basophil # : 0.03 K/uL  Auto Neutrophil % : 73.6 %  Auto Lymphocyte % : 13.9 %  Auto Monocyte % : 9.1 %  Auto Eosinophil % : 2.1 %  Auto Basophil % : 0.3 %    BMP:    12-20 @ 08:02    Blood Urea Nitrogen - 31  Calcium - 9.6  Carbond Dioxide - 25  Chloride - 108  Creatinine - 1.1  Glucose - 94  Potassium - 4.3  Sodium - 142      Hemoglobin A1c -   PT/INR - ( 18 Dec 2024 07:21 )   PT: 12.40 sec;   INR: 1.05 ratio         PTT - ( 18 Dec 2024 07:21 )  PTT:29.3 sec  Urine Culture:            MEDICATIONS  (STANDING):  apixaban 2.5 milliGRAM(s) Oral every 12 hours  folic acid 1 milliGRAM(s) Oral daily  furosemide    Tablet 20 milliGRAM(s) Oral <User Schedule>  rosuvastatin 40 milliGRAM(s) Oral at bedtime  sertraline 100 milliGRAM(s) Oral daily  sertraline 25 milliGRAM(s) Oral at bedtime  thiamine 100 milliGRAM(s) Oral every 24 hours    MEDICATIONS  (PRN):  acetaminophen     Tablet .. 650 milliGRAM(s) Oral every 6 hours PRN Temp greater or equal to 38C (100.4F), Mild Pain (1 - 3)  aluminum hydroxide/magnesium hydroxide/simethicone Suspension 30 milliLiter(s) Oral every 4 hours PRN Dyspepsia  melatonin 3 milliGRAM(s) Oral at bedtime PRN Insomnia  ondansetron Injectable 4 milliGRAM(s) IV Push every 8 hours PRN Nausea and/or Vomiting

## 2024-12-22 LAB
BASOPHILS # BLD AUTO: 0.03 K/UL — SIGNIFICANT CHANGE UP (ref 0–0.2)
BASOPHILS NFR BLD AUTO: 0.3 % — SIGNIFICANT CHANGE UP (ref 0–1)
EOSINOPHIL # BLD AUTO: 0.13 K/UL — SIGNIFICANT CHANGE UP (ref 0–0.7)
EOSINOPHIL NFR BLD AUTO: 1.3 % — SIGNIFICANT CHANGE UP (ref 0–8)
HCT VFR BLD CALC: 32.5 % — LOW (ref 37–47)
HGB BLD-MCNC: 10.4 G/DL — LOW (ref 12–16)
IMM GRANULOCYTES NFR BLD AUTO: 0.8 % — HIGH (ref 0.1–0.3)
LYMPHOCYTES # BLD AUTO: 0.99 K/UL — LOW (ref 1.2–3.4)
LYMPHOCYTES # BLD AUTO: 10 % — LOW (ref 20.5–51.1)
MCHC RBC-ENTMCNC: 27 PG — SIGNIFICANT CHANGE UP (ref 27–31)
MCHC RBC-ENTMCNC: 32 G/DL — SIGNIFICANT CHANGE UP (ref 32–37)
MCV RBC AUTO: 84.4 FL — SIGNIFICANT CHANGE UP (ref 81–99)
MONOCYTES # BLD AUTO: 0.76 K/UL — HIGH (ref 0.1–0.6)
MONOCYTES NFR BLD AUTO: 7.7 % — SIGNIFICANT CHANGE UP (ref 1.7–9.3)
NEUTROPHILS # BLD AUTO: 7.94 K/UL — HIGH (ref 1.4–6.5)
NEUTROPHILS NFR BLD AUTO: 79.9 % — HIGH (ref 42.2–75.2)
NRBC # BLD: 0 /100 WBCS — SIGNIFICANT CHANGE UP (ref 0–0)
PLATELET # BLD AUTO: 261 K/UL — SIGNIFICANT CHANGE UP (ref 130–400)
PMV BLD: 10 FL — SIGNIFICANT CHANGE UP (ref 7.4–10.4)
RBC # BLD: 3.85 M/UL — LOW (ref 4.2–5.4)
RBC # FLD: 14.2 % — SIGNIFICANT CHANGE UP (ref 11.5–14.5)
WBC # BLD: 9.93 K/UL — SIGNIFICANT CHANGE UP (ref 4.8–10.8)
WBC # FLD AUTO: 9.93 K/UL — SIGNIFICANT CHANGE UP (ref 4.8–10.8)

## 2024-12-22 PROCEDURE — 99232 SBSQ HOSP IP/OBS MODERATE 35: CPT

## 2024-12-22 RX ORDER — VALSARTAN 80 MG/1
80 TABLET ORAL DAILY
Refills: 0 | Status: DISCONTINUED | OUTPATIENT
Start: 2024-12-22 | End: 2024-12-26

## 2024-12-22 RX ADMIN — APIXABAN 2.5 MILLIGRAM(S): 5 TABLET, FILM COATED ORAL at 06:23

## 2024-12-22 RX ADMIN — THIAMINE HYDROCHLORIDE 100 MILLIGRAM(S): 100 INJECTION, SOLUTION INTRAMUSCULAR; INTRAVENOUS at 11:29

## 2024-12-22 RX ADMIN — VALSARTAN 80 MILLIGRAM(S): 80 TABLET ORAL at 20:27

## 2024-12-22 RX ADMIN — Medication 1 MILLIGRAM(S): at 11:29

## 2024-12-22 RX ADMIN — SERTRALINE HYDROCHLORIDE 25 MILLIGRAM(S): 25 TABLET ORAL at 22:11

## 2024-12-22 RX ADMIN — SERTRALINE HYDROCHLORIDE 100 MILLIGRAM(S): 25 TABLET ORAL at 11:29

## 2024-12-22 RX ADMIN — APIXABAN 2.5 MILLIGRAM(S): 5 TABLET, FILM COATED ORAL at 17:57

## 2024-12-22 RX ADMIN — ROSUVASTATIN 40 MILLIGRAM(S): 40 TABLET, FILM COATED ORAL at 22:11

## 2024-12-22 NOTE — PROGRESS NOTE ADULT - ASSESSMENT
79 year old female past medical history of  HTN, HLD presenting for a fall down a few steps 1 week ago and visual hallucinations. She was evaluated after the fall and was found to have a fracture in the right wrist and shoulder. She was placed in a sling, but has been unable to tolerate splint so family member removed it.     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 Pt states hallucinations have stopped since admission   - patient states she drink 2-3 glasses of wine daily for the past 20 years   - Monitor for alcohol withdrawal symptoms   - REEG - generalized slowing     #Low grade temp   - asymptomatic, no leukocytosis   - monitor for now     #acute urinary retention - resolved  - patient passed TOV and voiding freely     #Right proximal humerus fracture and Right distal radius fracture s/p fall  - Ortho eval appreciated   - continue pain control and sling  - PT/OT eval appreciated - SNF recommended - discussed with case management    #HTN - start Valsartan 80mg daily   #Chronic LE edema - on Lasix   - continue statin     #Hx of DVT  - Cont Eliquis    #Anxiety/depression  - Cont sertraline 100mg po in the morning and 25mg po qhs  - On bupropion 75 qD at home - resume today     Progress Note Handoff  Pending Consults: STR Placement

## 2024-12-22 NOTE — PROGRESS NOTE ADULT - SUBJECTIVE AND OBJECTIVE BOX
Pt seen and examined at bedside.         VITAL SIGNS (Last 24 hrs):  T(C): 36.8 (12-22-24 @ 07:00), Max: 37.9 (12-22-24 @ 00:00)  HR: 85 (12-22-24 @ 07:00) (80 - 85)  BP: 172/74 (12-22-24 @ 07:00) (139/74 - 186/95)  RR: 17 (12-22-24 @ 07:00) (17 - 18)  SpO2: 95% (12-22-24 @ 00:00) (94% - 95%)          I&O's Summary    21 Dec 2024 07:01  -  22 Dec 2024 07:00  ------------------------------------------------   IN: 0 mL / OUT: 1100 mL / NET: -1100 mL        PHYSICAL EXAM:  GENERAL: NAD, well-developed  HEAD:  Atraumatic, Normocephalic  EYES: EOMI, PERRLA, conjunctiva and sclera clear  NECK: Supple, No JVD  CHEST/LUNG: Clear to auscultation bilaterally; No wheeze  HEART: Regular rate and rhythm; No murmurs, rubs, or gallops  ABDOMEN: Soft, Nontender, Nondistended; Bowel sounds present  EXTREMITIES:  2+ Peripheral Pulses, No clubbing, cyanosis, or edema  PSYCH: AAOx3  NEUROLOGY: non-focal  SKIN: No rashes or lesions    Labs Reviewed  Spoke to patient in regards to abnormal labs.    CBC Full  -  ( 22 Dec 2024 06:25 )  WBC Count : 9.93 K/uL  Hemoglobin : 10.4 g/dL  Hematocrit : 32.5 %  Platelet Count - Automated : 261 K/uL  Mean Cell Volume : 84.4 fL  Mean Cell Hemoglobin : 27.0 pg  Mean Cell Hemoglobin Concentration : 32.0 g/dL  Auto Neutrophil # : 7.94 K/uL  Auto Lymphocyte # : 0.99 K/uL  Auto Monocyte # : 0.76 K/uL  Auto Eosinophil # : 0.13 K/uL  Auto Basophil # : 0.03 K/uL  Auto Neutrophil % : 79.9 %  Auto Lymphocyte % : 10.0 %  Auto Monocyte % : 7.7 %  Auto Eosinophil % : 1.3 %  Auto Basophil % : 0.3 %    BMP:    12-20 @ 08:02    Blood Urea Nitrogen - 31  Calcium - 9.6  Carbond Dioxide - 25  Chloride - 108  Creatinine - 1.1  Glucose - 94  Potassium - 4.3  Sodium - 142                 MEDICATIONS  (STANDING):  amLODIPine   Tablet 5 milliGRAM(s) Oral daily  apixaban 2.5 milliGRAM(s) Oral every 12 hours  folic acid 1 milliGRAM(s) Oral daily  furosemide    Tablet 20 milliGRAM(s) Oral <User Schedule>  rosuvastatin 40 milliGRAM(s) Oral at bedtime  sertraline 100 milliGRAM(s) Oral daily  sertraline 25 milliGRAM(s) Oral at bedtime  thiamine 100 milliGRAM(s) Oral every 24 hours    MEDICATIONS  (PRN):  acetaminophen     Tablet .. 650 milliGRAM(s) Oral every 6 hours PRN Temp greater or equal to 38C (100.4F), Mild Pain (1 - 3)  aluminum hydroxide/magnesium hydroxide/simethicone Suspension 30 milliLiter(s) Oral every 4 hours PRN Dyspepsia  melatonin 3 milliGRAM(s) Oral at bedtime PRN Insomnia  ondansetron Injectable 4 milliGRAM(s) IV Push every 8 hours PRN Nausea and/or Vomiting

## 2024-12-23 LAB
BASOPHILS # BLD AUTO: 0.03 K/UL — SIGNIFICANT CHANGE UP (ref 0–0.2)
BASOPHILS NFR BLD AUTO: 0.3 % — SIGNIFICANT CHANGE UP (ref 0–1)
EOSINOPHIL # BLD AUTO: 0.15 K/UL — SIGNIFICANT CHANGE UP (ref 0–0.7)
EOSINOPHIL NFR BLD AUTO: 1.5 % — SIGNIFICANT CHANGE UP (ref 0–8)
HCT VFR BLD CALC: 33.4 % — LOW (ref 37–47)
HGB BLD-MCNC: 10.7 G/DL — LOW (ref 12–16)
IMM GRANULOCYTES NFR BLD AUTO: 0.7 % — HIGH (ref 0.1–0.3)
LYMPHOCYTES # BLD AUTO: 1.13 K/UL — LOW (ref 1.2–3.4)
LYMPHOCYTES # BLD AUTO: 11.3 % — LOW (ref 20.5–51.1)
MCHC RBC-ENTMCNC: 26.8 PG — LOW (ref 27–31)
MCHC RBC-ENTMCNC: 32 G/DL — SIGNIFICANT CHANGE UP (ref 32–37)
MCV RBC AUTO: 83.5 FL — SIGNIFICANT CHANGE UP (ref 81–99)
MONOCYTES # BLD AUTO: 0.75 K/UL — HIGH (ref 0.1–0.6)
MONOCYTES NFR BLD AUTO: 7.5 % — SIGNIFICANT CHANGE UP (ref 1.7–9.3)
NEUTROPHILS # BLD AUTO: 7.86 K/UL — HIGH (ref 1.4–6.5)
NEUTROPHILS NFR BLD AUTO: 78.7 % — HIGH (ref 42.2–75.2)
NRBC # BLD: 0 /100 WBCS — SIGNIFICANT CHANGE UP (ref 0–0)
PLATELET # BLD AUTO: 274 K/UL — SIGNIFICANT CHANGE UP (ref 130–400)
PMV BLD: 10.3 FL — SIGNIFICANT CHANGE UP (ref 7.4–10.4)
RBC # BLD: 4 M/UL — LOW (ref 4.2–5.4)
RBC # FLD: 13.9 % — SIGNIFICANT CHANGE UP (ref 11.5–14.5)
WBC # BLD: 9.99 K/UL — SIGNIFICANT CHANGE UP (ref 4.8–10.8)
WBC # FLD AUTO: 9.99 K/UL — SIGNIFICANT CHANGE UP (ref 4.8–10.8)

## 2024-12-23 PROCEDURE — 99232 SBSQ HOSP IP/OBS MODERATE 35: CPT

## 2024-12-23 RX ADMIN — APIXABAN 2.5 MILLIGRAM(S): 5 TABLET, FILM COATED ORAL at 17:39

## 2024-12-23 RX ADMIN — Medication 1 MILLIGRAM(S): at 11:14

## 2024-12-23 RX ADMIN — Medication 20 MILLIGRAM(S): at 05:22

## 2024-12-23 RX ADMIN — THIAMINE HYDROCHLORIDE 100 MILLIGRAM(S): 100 INJECTION, SOLUTION INTRAMUSCULAR; INTRAVENOUS at 11:14

## 2024-12-23 RX ADMIN — SERTRALINE HYDROCHLORIDE 100 MILLIGRAM(S): 25 TABLET ORAL at 11:14

## 2024-12-23 RX ADMIN — SERTRALINE HYDROCHLORIDE 25 MILLIGRAM(S): 25 TABLET ORAL at 21:45

## 2024-12-23 RX ADMIN — APIXABAN 2.5 MILLIGRAM(S): 5 TABLET, FILM COATED ORAL at 05:21

## 2024-12-23 RX ADMIN — ROSUVASTATIN 40 MILLIGRAM(S): 40 TABLET, FILM COATED ORAL at 21:45

## 2024-12-23 RX ADMIN — VALSARTAN 80 MILLIGRAM(S): 80 TABLET ORAL at 05:22

## 2024-12-23 NOTE — PROGRESS NOTE ADULT - SUBJECTIVE AND OBJECTIVE BOX
CHELSEY MOSQUERA 79y Female  MRN#: [id number]     Hospital Day: 6d    Pt is currently admitted with the primary diagnosis of  Altered mental status        SUBJECTIVE     Subjective complaints  Pt was evaluated this am. Patient has no complaints    Review of systems  ROS is negative                                            ----------------------------------------------------------    HOSPITAL COURSE     12/23 - PT still complaining of the same shoulder pain, Ortho decided against surgical intervention, Pt has no complaints this morning                                            ----------------------------------------------------------  OBJECTIVE  PAST MEDICAL & SURGICAL HISTORY  HTN (hypertension)    OP (osteoporosis)    High cholesterol    Depression    S/P cholecystectomy                                              -----------------------------------------------------------  ALLERGIES:  penicillins (Swelling)                                            ------------------------------------------------------------    HOME MEDICATIONS  Home Medications:  buPROPion 75 mg oral tablet: 1 tab(s) orally once a day (18 Dec 2024 14:02)  Eliquis 2.5 mg oral tablet: 1 tab(s) orally 2 times a day (18 Dec 2024 14:06)  folic acid 1 mg oral tablet: 1 tab(s) orally once a day (21 Dec 2024 10:42)  furosemide 20 mg oral tablet: 1 tab(s) orally every other day (18 Dec 2024 14:06)  rosuvastatin 40 mg oral tablet: 1 tab(s) orally once a day (18 Dec 2024 14:03)  sertraline 100 mg oral tablet: 1 tab(s) orally once a day (18 Dec 2024 14:03)  sertraline 25 mg oral tablet: 1 tab(s) orally once a day (at bedtime) (18 Dec 2024 14:04)  thiamine 100 mg oral tablet: 1 tab(s) orally every 24 hours (21 Dec 2024 10:42)                           MEDICATIONS:  STANDING MEDICATIONS  apixaban 2.5 milliGRAM(s) Oral every 12 hours  folic acid 1 milliGRAM(s) Oral daily  furosemide    Tablet 20 milliGRAM(s) Oral <User Schedule>  rosuvastatin 40 milliGRAM(s) Oral at bedtime  sertraline 100 milliGRAM(s) Oral daily  sertraline 25 milliGRAM(s) Oral at bedtime  thiamine 100 milliGRAM(s) Oral every 24 hours  valsartan 80 milliGRAM(s) Oral daily    PRN MEDICATIONS  acetaminophen     Tablet .. 650 milliGRAM(s) Oral every 6 hours PRN  aluminum hydroxide/magnesium hydroxide/simethicone Suspension 30 milliLiter(s) Oral every 4 hours PRN  melatonin 3 milliGRAM(s) Oral at bedtime PRN  ondansetron Injectable 4 milliGRAM(s) IV Push every 8 hours PRN                                            ------------------------------------------------------------  VITAL SIGNS: Last 24 Hours  T(C): 36.7 (23 Dec 2024 07:30), Max: 37.2 (22 Dec 2024 16:00)  T(F): 98 (23 Dec 2024 07:30), Max: 99 (22 Dec 2024 16:00)  HR: 77 (23 Dec 2024 07:30) (70 - 82)  BP: 144/82 (23 Dec 2024 07:30) (138/71 - 173/-)  BP(mean): 107 (23 Dec 2024 05:20) (93 - 107)  RR: 18 (23 Dec 2024 05:20) (18 - 18)  SpO2: 96% (23 Dec 2024 07:30) (95% - 99%)      12-22-24 @ 07:01  -  12-23-24 @ 07:00  --------------------------------------------------------  IN: 0 mL / OUT: 400 mL / NET: -400 mL                                             --------------------------------------------------------------  LABS:                        10.7   9.99  )-----------( 274      ( 23 Dec 2024 08:31 )             33.4                                                                     -------------------------------------------------------------  RADIOLOGY, TELEMETRY:                                            --------------------------------------------------------------    PHYSICAL EXAM:  GENERAL: NAD, lying in bed comfortably  HEAD:  Atraumatic, Normocephalic  CHEST/LUNG: Clear to auscultation bilaterally; No rales, rhonchi, wheezing, or rubs. Unlabored respirations  HEART: regular rate and rhythm; No murmurs, rubs, or gallops  ABDOMEN: Bowel sounds present; Soft, Nontender, Nondistended.    EXTREMITIES: Warm. No clubbing, cyanosis, or edema  NERVOUS SYSTEM:  Alert & Oriented X3. No focal deficits                                            --------------------------------------------------------------

## 2024-12-24 PROCEDURE — 99232 SBSQ HOSP IP/OBS MODERATE 35: CPT

## 2024-12-24 PROCEDURE — 73120 X-RAY EXAM OF HAND: CPT | Mod: 26,RT

## 2024-12-24 RX ORDER — CHLORHEXIDINE GLUCONATE 1.2 MG/ML
1 RINSE ORAL
Refills: 0 | Status: DISCONTINUED | OUTPATIENT
Start: 2024-12-24 | End: 2024-12-26

## 2024-12-24 RX ORDER — VALSARTAN 80 MG/1
1 TABLET ORAL
Qty: 30 | Refills: 2
Start: 2024-12-24 | End: 2025-03-23

## 2024-12-24 RX ADMIN — APIXABAN 2.5 MILLIGRAM(S): 5 TABLET, FILM COATED ORAL at 18:11

## 2024-12-24 RX ADMIN — ROSUVASTATIN 40 MILLIGRAM(S): 40 TABLET, FILM COATED ORAL at 21:20

## 2024-12-24 RX ADMIN — VALSARTAN 80 MILLIGRAM(S): 80 TABLET ORAL at 05:28

## 2024-12-24 RX ADMIN — APIXABAN 2.5 MILLIGRAM(S): 5 TABLET, FILM COATED ORAL at 05:28

## 2024-12-24 RX ADMIN — SERTRALINE HYDROCHLORIDE 100 MILLIGRAM(S): 25 TABLET ORAL at 11:25

## 2024-12-24 RX ADMIN — SERTRALINE HYDROCHLORIDE 25 MILLIGRAM(S): 25 TABLET ORAL at 21:20

## 2024-12-24 RX ADMIN — THIAMINE HYDROCHLORIDE 100 MILLIGRAM(S): 100 INJECTION, SOLUTION INTRAMUSCULAR; INTRAVENOUS at 11:25

## 2024-12-24 RX ADMIN — Medication 1 MILLIGRAM(S): at 11:25

## 2024-12-24 NOTE — PROGRESS NOTE ADULT - SUBJECTIVE AND OBJECTIVE BOX
S: No new events/complaints      All other pertinent ROS negative.      12-23-24 @ 07:01  -  12-24-24 @ 07:00  --------------------------------------------------------  IN: 0 mL / OUT: 1400 mL / NET: -1400 mL      Vital Signs Last 24 Hrs  T(C): 36.5 (24 Dec 2024 08:27), Max: 36.9 (23 Dec 2024 17:05)  T(F): 97.7 (24 Dec 2024 08:27), Max: 98.4 (23 Dec 2024 17:05)  HR: 70 (24 Dec 2024 08:27) (66 - 77)  BP: 118/64 (24 Dec 2024 08:27) (113/68 - 154/78)  BP(mean): --  RR: 18 (24 Dec 2024 08:27) (18 - 18)  SpO2: 94% (24 Dec 2024 08:27) (93% - 97%)    Parameters below as of 24 Dec 2024 08:27  Patient On (Oxygen Delivery Method): room air      PHYSICAL EXAM:    Constitutional: NAD, awake and alert  HEENT: PERR, EOMI, Normal Hearing, MMM  Neck: Soft and supple, No LAD, No JVD  Respiratory: Breath sounds are clear bilaterally, No wheezing, rales or rhonchi  Cardiovascular: S1 and S2, regular rate and rhythm, no Murmurs, gallops or rubs  Gastrointestinal: Bowel Sounds present, soft, nontender, nondistended, no guarding, no rebound  Extremities: Right arm in sling.      MEDICATIONS:  MEDICATIONS  (STANDING):  apixaban 2.5 milliGRAM(s) Oral every 12 hours  chlorhexidine 2% Cloths 1 Application(s) Topical <User Schedule>  folic acid 1 milliGRAM(s) Oral daily  furosemide    Tablet 20 milliGRAM(s) Oral <User Schedule>  rosuvastatin 40 milliGRAM(s) Oral at bedtime  sertraline 100 milliGRAM(s) Oral daily  sertraline 25 milliGRAM(s) Oral at bedtime  thiamine 100 milliGRAM(s) Oral every 24 hours  valsartan 80 milliGRAM(s) Oral daily      LABS: All Labs Reviewed:                        10.7   9.99  )-----------( 274      ( 23 Dec 2024 08:31 )             33.4                 Blood Culture:     Radiology: reviewed

## 2024-12-24 NOTE — PROGRESS NOTE ADULT - ASSESSMENT
79 year old female past medical history of  HTN, HLD presenting for a fall down a few steps 1 week ago and visual hallucinations. She was evaluated after the fall and was found to have a fracture in the right wrist and shoulder. She was placed in a sling, but has been unable to tolerate splint so family member removed it.     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 Pt states hallucinations have stopped since admission   - patient states she drink 2-3 glasses of wine daily for the past 20 years   - Monitor for alcohol withdrawal symptoms   - REEG - generalized slowing     #Low grade temp   - asymptomatic, no leukocytosis   - monitor for now     #acute urinary retention - resolved  - patient passed TOV and voiding freely     #Right proximal humerus fracture and Right distal radius fracture s/p fall  - Ortho eval appreciated   - continue pain control and sling  - PT/OT eval appreciated - SNF recommended - discussed with case management    #HTN - start Valsartan 80mg daily   #Chronic LE edema - on Lasix   - continue statin     #Hx of DVT  - Cont Eliquis    #Anxiety/depression  - Cont sertraline 100mg po in the morning and 25mg po qhs  - On bupropion 75 qD at home - resume today     Progress Note Handoff  Pending : STR Placement ([facility]) . PENDING AUTH  Keep doing some PT sesssions while here to maintain mobility. Last session: Walked with assistance on 12/23.,

## 2024-12-25 PROCEDURE — 99231 SBSQ HOSP IP/OBS SF/LOW 25: CPT

## 2024-12-25 RX ADMIN — SERTRALINE HYDROCHLORIDE 100 MILLIGRAM(S): 25 TABLET ORAL at 12:02

## 2024-12-25 RX ADMIN — ROSUVASTATIN 40 MILLIGRAM(S): 40 TABLET, FILM COATED ORAL at 21:40

## 2024-12-25 RX ADMIN — Medication 1 MILLIGRAM(S): at 12:02

## 2024-12-25 RX ADMIN — APIXABAN 2.5 MILLIGRAM(S): 5 TABLET, FILM COATED ORAL at 05:35

## 2024-12-25 RX ADMIN — VALSARTAN 80 MILLIGRAM(S): 80 TABLET ORAL at 05:35

## 2024-12-25 RX ADMIN — APIXABAN 2.5 MILLIGRAM(S): 5 TABLET, FILM COATED ORAL at 17:17

## 2024-12-25 RX ADMIN — CHLORHEXIDINE GLUCONATE 1 APPLICATION(S): 1.2 RINSE ORAL at 05:35

## 2024-12-25 RX ADMIN — THIAMINE HYDROCHLORIDE 100 MILLIGRAM(S): 100 INJECTION, SOLUTION INTRAMUSCULAR; INTRAVENOUS at 12:02

## 2024-12-25 RX ADMIN — SERTRALINE HYDROCHLORIDE 25 MILLIGRAM(S): 25 TABLET ORAL at 21:40

## 2024-12-25 NOTE — PROGRESS NOTE ADULT - SUBJECTIVE AND OBJECTIVE BOX
Patient is a 79y old  Female who presents with a chief complaint of Fall (12-25-24)      Pt seen and examined at bedside. No CP or SOB.          PAST MEDICAL & SURGICAL HISTORY:  HTN (hypertension)    OP (osteoporosis)    High cholesterol    Depression    S/P cholecystectomy        VITAL SIGNS (Last 24 hrs):  T(C): 36.4 (12-25-24 @ 08:34), Max: 36.9 (12-24-24 @ 16:22)  HR: 74 (12-25-24 @ 08:34) (74 - 75)  BP: 138/69 (12-25-24 @ 08:34) (131/72 - 147/74)  RR: 18 (12-25-24 @ 08:34) (18 - 19)  SpO2: 97% (12-25-24 @ 08:34) (94% - 97%)  Wt(kg): --  Daily     Daily     I&O's Summary    24 Dec 2024 07:01  -  25 Dec 2024 07:00  --------------------------------------------------------  IN: 0 mL / OUT: 1350 mL / NET: -1350 mL        PHYSICAL EXAM:  GENERAL: NAD, well-developed  HEAD:  Atraumatic, Normocephalic  EYES: EOMI, PERRLA, conjunctiva and sclera clear  NECK: Supple, No JVD  CHEST/LUNG: Clear to auscultation bilaterally; No wheeze  HEART: Regular rate and rhythm; No murmurs, rubs, or gallops  ABDOMEN: Soft, Nontender, Nondistended; Bowel sounds present  EXTREMITIES:  2+ Peripheral Pulses, No clubbing, cyanosis, or edema, right digits swollen but no evidence of cyanosis, compartments soft, sensation intact, wearing right long arm cast  PSYCH: AAOx3  NEUROLOGY: non-focal  SKIN: No rashes or lesions    Labs Reviewed  Spoke to patient in regards to abnormal labs.    CBC Full  -  ( 23 Dec 2024 08:31 )  WBC Count : 9.99 K/uL  Hemoglobin : 10.7 g/dL  Hematocrit : 33.4 %  Platelet Count - Automated : 274 K/uL  Mean Cell Volume : 83.5 fL  Mean Cell Hemoglobin : 26.8 pg  Mean Cell Hemoglobin Concentration : 32.0 g/dL  Auto Neutrophil # : 7.86 K/uL  Auto Lymphocyte # : 1.13 K/uL  Auto Monocyte # : 0.75 K/uL  Auto Eosinophil # : 0.15 K/uL  Auto Basophil # : 0.03 K/uL  Auto Neutrophil % : 78.7 %  Auto Lymphocyte % : 11.3 %  Auto Monocyte % : 7.5 %  Auto Eosinophil % : 1.5 %  Auto Basophil % : 0.3 %    BMP:      Hemoglobin A1c -     Urine Culture:        COVID Labs  CRP:      D-Dimer:            Imaging reviewed independently and reviewed read        MEDICATIONS  (STANDING):  apixaban 2.5 milliGRAM(s) Oral every 12 hours  chlorhexidine 2% Cloths 1 Application(s) Topical <User Schedule>  folic acid 1 milliGRAM(s) Oral daily  furosemide    Tablet 20 milliGRAM(s) Oral <User Schedule>  rosuvastatin 40 milliGRAM(s) Oral at bedtime  sertraline 100 milliGRAM(s) Oral daily  sertraline 25 milliGRAM(s) Oral at bedtime  thiamine 100 milliGRAM(s) Oral every 24 hours  valsartan 80 milliGRAM(s) Oral daily    MEDICATIONS  (PRN):  acetaminophen     Tablet .. 650 milliGRAM(s) Oral every 6 hours PRN Temp greater or equal to 38C (100.4F), Mild Pain (1 - 3)  aluminum hydroxide/magnesium hydroxide/simethicone Suspension 30 milliLiter(s) Oral every 4 hours PRN Dyspepsia  melatonin 3 milliGRAM(s) Oral at bedtime PRN Insomnia  ondansetron Injectable 4 milliGRAM(s) IV Push every 8 hours PRN Nausea and/or Vomiting

## 2024-12-25 NOTE — PROGRESS NOTE ADULT - ASSESSMENT
79 year old female past medical history of  HTN, HLD presenting for a fall down a few steps 1 week ago and visual hallucinations. She was evaluated after the fall and was found to have a fracture in the right wrist and shoulder. She was placed in a sling, but has been unable to tolerate splint so family member removed it.     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 Pt states hallucinations have stopped since admission   - patient states she drink 2-3 glasses of wine daily for the past 20 years   - Monitor for alcohol withdrawal symptoms   - REEG - generalized slowing     #Low grade temp   - asymptomatic, no leukocytosis   - monitor for now     #acute urinary retention - resolved  - patient passed TOV and voiding freely     #Right proximal humerus fracture and Right distal radius fracture s/p fall  - Ortho eval appreciated   - continue pain control and sling  - PT/OT eval appreciated - SNF recommended - discussed with case management    #HTN - start Valsartan 80mg daily   #Chronic LE edema - on Lasix   - continue statin     #Hx of DVT  - Cont Eliquis    #Anxiety/depression  - Cont sertraline 100mg po in the morning and 25mg po qhs  - On bupropion 75 qD at home - resume today     Progress Note Handoff  Pending : STR Placement ([facility]) . PENDING AUTH  Keep doing some PT sesssions while here to maintain mobility. Last session: Walked with assistance on 12/23.,   79 year old female past medical history of  HTN, HLD presenting for a fall down a few steps 1 week ago and visual hallucinations. She was evaluated after the fall and was found to have a fracture in the right wrist and shoulder. She was placed in a sling, but has been unable to tolerate splint so family member removed it.     #Right proximal humerus fracture and Right distal radius fracture s/p fall  - Ortho eval appreciated   - continue pain control and sling  - 12/24 pt had pain at right base of thumb - found to have blue discoloration and worsening swelling - thumb portion of cast removed and x ray obtained - pt blue discoloration resolved, pt sensation intact, warm to touch, soft to touch - ortho made aware 12/24, pending re-evaluation, pending x ray result of right hand   - reached out to ortho 12/25 to come evaluate pt  - PT/OT eval appreciated - SNF recommended     #Visual hallucinations - resolved   #AUD  #Suspected folate and thiamine def  - suspected related daily alcohol use. Pt states hallucinations have stopped since admission   - patient states she drink 2-3 glasses of wine daily for the past 20 years   - Monitor for alcohol withdrawal symptoms   - REEG - generalized slowing   - MRIH negative     #Low grade temp   - asymptomatic, no leukocytosis   - monitor for now   - hold abx    #acute urinary retention - resolved  - patient passed TOV and voiding freely     #HTN - start Valsartan 80mg daily   #Chronic LE edema - on Lasix   - continue statin     #Hx of DVT  - Cont Eliquis    #Anxiety/depression  - Cont sertraline 100mg po in the morning and 25mg po qhs  - On bupropion 75 qD at home - resume today     dvt ppx - eliquis  Pending : STR Placement ([facility]) . PENDING AUTH, pending ortho re-eval for right hand    I personally reviewed labs and imaging and ordered necessary testing/medications  I discussed care of the patient with licensed providers  I personally reviewed chart and consultant recommendations  Pt has complex medical issues that require extensive diagnosis and intervention / threat to life  I have personally spent 25 minutes of time on the encounter which excludes teaching and separately reported services

## 2024-12-26 ENCOUNTER — TRANSCRIPTION ENCOUNTER (OUTPATIENT)
Age: 79
End: 2024-12-26

## 2024-12-26 PROCEDURE — 99232 SBSQ HOSP IP/OBS MODERATE 35: CPT

## 2024-12-26 RX ADMIN — APIXABAN 2.5 MILLIGRAM(S): 5 TABLET, FILM COATED ORAL at 17:09

## 2024-12-26 RX ADMIN — THIAMINE HYDROCHLORIDE 100 MILLIGRAM(S): 100 INJECTION, SOLUTION INTRAMUSCULAR; INTRAVENOUS at 10:14

## 2024-12-26 RX ADMIN — Medication 20 MILLIGRAM(S): at 05:08

## 2024-12-26 RX ADMIN — APIXABAN 2.5 MILLIGRAM(S): 5 TABLET, FILM COATED ORAL at 05:08

## 2024-12-26 RX ADMIN — Medication 1 MILLIGRAM(S): at 11:12

## 2024-12-26 RX ADMIN — VALSARTAN 80 MILLIGRAM(S): 80 TABLET ORAL at 05:08

## 2024-12-26 RX ADMIN — SERTRALINE HYDROCHLORIDE 100 MILLIGRAM(S): 25 TABLET ORAL at 11:12

## 2024-12-26 RX ADMIN — CHLORHEXIDINE GLUCONATE 1 APPLICATION(S): 1.2 RINSE ORAL at 05:08

## 2024-12-26 NOTE — PROGRESS NOTE ADULT - PROVIDER SPECIALTY LIST ADULT
Hospitalist
Hospitalist
Internal Medicine
Orthopedics
Hospitalist
Internal Medicine
Hospitalist
Internal Medicine

## 2024-12-26 NOTE — PROGRESS NOTE ADULT - ATTENDING COMMENTS
79 year old female past medical history of  HTN, HLD presenting for a fall down a few steps 1 week ago and visual hallucinations. She was evaluated after the fall and was found to have a fracture in the right wrist and shoulder. She was placed in a sling, but has been unable to tolerate splint so family member removed it.     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 Pt states hallucinations have stopped since admission   - patient states she drink 2-3 glasses of wine daily for the past 20 years   - Monitor for alcohol withdrawal symptoms   - REEG - generalized slowing     #Low grade temp   - asymptomatic, no leukocytosis   - monitor for now     #acute urinary retention - resolved  - patient passed TOV and voiding freely     #Right proximal humerus fracture and Right distal radius fracture s/p fall  - Ortho eval appreciated   - continue pain control and sling  - PT/OT eval appreciated - SNF recommended - discussed with case management    #HTN - start Valsartan 80mg daily   #Chronic LE edema - on Lasix   - continue statin     #Hx of DVT  - Cont Eliquis    #Anxiety/depression  - Cont sertraline 100mg po in the morning and 25mg po qhs  - On bupropion 75 qD at home - resume today     Progress Note Handoff  Pending Consults: STR Placement ([facility])
patient seen and examined , agree with pgy  assesment and plan except as indicated above,  all imaging and ekg reviewed independantly     GEN Lying in no acute distress  HEENT Pupils equal and reactive to light and accommodationSupple Neck  PULM Clear to auscultation bilaterally  CV s1s2 regular rate and rhythm  GI + bowel sounds nontnender  EXT R arm in sling   PSYCH awake alert and oriented x 3  INTEG No Lesions  NEURO Moves all extremities    PROGRESS NOTE HANDOFF    Pending:  dc planning     Family discussion: patient verbalized understanding and agreeable to plan of care     Disposition: Nursing facility
79 year old female past medical history of  HTN, HLD presenting for a fall down a few steps 1 week ago and visual hallucinations. She was evaluated after the fall and was found to have a fracture in the right wrist and shoulder. She was placed in a sling, but has been unable to tolerate splint so family member removed it.     #Visual hallucinations  likley related daily alcohol use. Pt states hallucinations have stopped since admission   - pt drinks alcohol daily baseis since 10 yr   - Monitor for alcohol withdrawal, [facility] protocol  -  Neg, REEG w no seizure   - no suicidal ideations, thoughts or intentions     #acute urianry retention  - tov today,     #Right proximal humerus fracture  #Right distal radius fracture  Ortho eval noted  - Sling placed  - Non-operative at this point in time  - PT/OT eval noted, recom STR     #HTN/HLD  On lasix for chronic LE edema  - Cont lasix 20 TTS  - Cont crestor    #Hx of DVT  - Cont Eliquis    #Anxiety/depression  - Cont sertraline 100/25  - On bupropion 75 qD at home, but not currently available. resume outpatient    DVT PPX, Eliquis    #Progress Note Handoff  Pending (specify): DC plan to STR

## 2024-12-26 NOTE — CHART NOTE - NSCHARTNOTEFT_GEN_A_CORE
pt seen and examined today   in good spirits   the splint is in place to the wrist right and the sling is supporting the shoulder   fingers are wtt and moving well  ecchymosis is expected as the pt has a proximal humeral fx and distal radius fracture   a/p   continue splint and sling   trial of non operative treatment   repeat xrays 10 days from the injury date recommended   best to have the head elevated to 45 degrees or better, to allow the right arm to hang   f/u with Dr Lang upon discharge

## 2024-12-26 NOTE — DISCHARGE NOTE NURSING/CASE MANAGEMENT/SOCIAL WORK - FINANCIAL ASSISTANCE
[facility] provides services at a reduced cost to those who are determined to be eligible through [facility]’s financial assistance program. Information regarding [facility]’s financial assistance program can be found by going to [email or web address] or by calling [phone].

## 2024-12-26 NOTE — DISCHARGE NOTE NURSING/CASE MANAGEMENT/SOCIAL WORK - NSDCPEFALRISK_GEN_ALL_CORE
For information on Fall & Injury Prevention, visit: [email or web address] OR  [email or web address] OR  https://www.cdc.gov/steadi/patient.html

## 2024-12-26 NOTE — DISCHARGE NOTE NURSING/CASE MANAGEMENT/SOCIAL WORK - PATIENT PORTAL LINK FT
You can access the FollowMyHealth Patient Portal offered by [facility] by registering at the following website: [email or web address]. By joining [email or web address]’s FollowMyHealth portal, you will also be able to view your health information using other applications (apps) compatible with our system.

## 2024-12-26 NOTE — PROGRESS NOTE ADULT - ASSESSMENT
79 year old female past medical history of  HTN, HLD presenting for a fall down a few steps 1 week ago and visual hallucinations. She was evaluated after the fall and was found to have a fracture in the right wrist and shoulder. She was placed in a sling, but has been unable to tolerate splint so family member removed it.     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 Pt states hallucinations have stopped since admission   - patient states she drink 2-3 glasses of wine daily for the past 20 years   - Monitor for alcohol withdrawal symptoms   - REEG - generalized slowing     #Low grade temp   - asymptomatic, no leukocytosis   - monitor for now     #acute urinary retention - resolved  - patient passed TOV and voiding freely     #Right proximal humerus fracture and Right distal radius fracture s/p fall  - Ortho eval appreciated   - continue pain control and sling  - PT/OT eval appreciated - SNF recommended - discussed with case management  - f/u 12/24 R.hand xray and ortho f/u for cast    #HTN - start Valsartan 80mg daily   #Chronic LE edema - on Lasix   - continue statin     #Hx of DVT  - Cont Eliquis    #Anxiety/depression  - Cont sertraline 100mg po in the morning and 25mg po qhs  - On bupropion 75 qD at home - resume today     Progress Note Handoff  Pending : STR Placement, r. hand xray    79 year old female past medical history of  HTN, HLD presenting for a fall down a few steps 1 week ago and visual hallucinations. She was evaluated after the fall and was found to have a fracture in the right wrist and shoulder. She was placed in a sling, but has been unable to tolerate splint so family member removed it.     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 Pt states hallucinations have stopped since admission   - patient states she drink 2-3 glasses of wine daily for the past 20 years   - Monitor for alcohol withdrawal symptoms   - REEG - generalized slowing     #Low grade temp   - asymptomatic, no leukocytosis   - monitor for now     #acute urinary retention - resolved  - patient passed TOV and voiding freely     #Right proximal humerus fracture and Right distal radius fracture s/p fall  - Ortho eval appreciated   - continue pain control and sling  - PT/OT eval appreciated - SNF recommended - discussed with case management  - 12/24 R.hand xray - similar fracture from previous xray, unremarkable  - ortho f/u for cast    #HTN - start Valsartan 80mg daily   #Chronic LE edema - on Lasix   - continue statin     #Hx of DVT  - Cont Eliquis    #Anxiety/depression  - Cont sertraline 100mg po in the morning and 25mg po qhs  - On bupropion 75 qD at home - resume today     Progress Note Handoff  Pending : STR Placement, r. hand xray

## 2024-12-27 VITALS
TEMPERATURE: 98 F | RESPIRATION RATE: 17 BRPM | SYSTOLIC BLOOD PRESSURE: 113 MMHG | OXYGEN SATURATION: 98 % | DIASTOLIC BLOOD PRESSURE: 74 MMHG | HEART RATE: 88 BPM

## 2025-01-02 ENCOUNTER — NON-APPOINTMENT (OUTPATIENT)
Age: 80
End: 2025-01-02
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
## 2025-01-02 DIAGNOSIS — Z86.718 PERSONAL HISTORY OF OTHER VENOUS THROMBOSIS AND EMBOLISM: ICD-10-CM

## 2025-01-02 DIAGNOSIS — M81.0 AGE-RELATED OSTEOPOROSIS WITHOUT CURRENT PATHOLOGICAL FRACTURE: ICD-10-CM

## 2025-01-02 DIAGNOSIS — I10 ESSENTIAL (PRIMARY) HYPERTENSION: ICD-10-CM

## 2025-01-02 DIAGNOSIS — F32.A DEPRESSION, UNSPECIFIED: ICD-10-CM

## 2025-01-02 DIAGNOSIS — S52.501A UNSPECIFIED FRACTURE OF THE LOWER END OF RIGHT RADIUS, INITIAL ENCOUNTER FOR CLOSED FRACTURE: ICD-10-CM

## 2025-01-02 DIAGNOSIS — S42.201A UNSPECIFIED FRACTURE OF UPPER END OF RIGHT HUMERUS, INITIAL ENCOUNTER FOR CLOSED FRACTURE: ICD-10-CM

## 2025-01-02 DIAGNOSIS — E78.5 HYPERLIPIDEMIA, UNSPECIFIED: ICD-10-CM

## 2025-01-02 DIAGNOSIS — Z88.0 ALLERGY STATUS TO PENICILLIN: ICD-10-CM
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
## 2025-01-02 DIAGNOSIS — I48.91 UNSPECIFIED ATRIAL FIBRILLATION: ICD-10-CM

## 2025-01-02 DIAGNOSIS — F41.8 OTHER SPECIFIED ANXIETY DISORDERS: ICD-10-CM

## 2025-01-02 DIAGNOSIS — F10.151 ALCOHOL ABUSE WITH ALCOHOL-INDUCED PSYCHOTIC DISORDER WITH HALLUCINATIONS: ICD-10-CM

## 2025-01-02 DIAGNOSIS — R33.9 RETENTION OF URINE, UNSPECIFIED: ICD-10-CM

## 2025-01-02 DIAGNOSIS — Z79.01 LONG TERM (CURRENT) USE OF ANTICOAGULANTS: ICD-10-CM

## 2025-01-09 ENCOUNTER — APPOINTMENT (OUTPATIENT)
Dept: ORTHOPEDIC SURGERY | Facility: ASSISTED LIVING FACILITY | Age: 80
End: 2025-01-09
Payer: MEDICARE

## 2025-01-09 PROCEDURE — 99304 1ST NF CARE SF/LOW MDM 25: CPT

## 2025-01-23 ENCOUNTER — APPOINTMENT (OUTPATIENT)
Dept: ORTHOPEDIC SURGERY | Facility: ASSISTED LIVING FACILITY | Age: 80
End: 2025-01-23
Payer: MEDICARE

## 2025-01-23 PROCEDURE — 99309 SBSQ NF CARE MODERATE MDM 30: CPT

## 2025-02-11 ENCOUNTER — APPOINTMENT (OUTPATIENT)
Dept: ORTHOPEDIC SURGERY | Facility: CLINIC | Age: 80
End: 2025-02-11
Payer: MEDICARE

## 2025-02-11 DIAGNOSIS — S52.531A COLLES' FRACTURE OF RIGHT RADIUS, INITIAL ENCOUNTER FOR CLOSED FRACTURE: ICD-10-CM

## 2025-02-11 PROCEDURE — 73110 X-RAY EXAM OF WRIST: CPT | Mod: RT

## 2025-02-11 PROCEDURE — 99203 OFFICE O/P NEW LOW 30 MIN: CPT | Mod: 25

## 2025-02-11 RX ORDER — METHYLPREDNISOLONE 4 MG/1
4 TABLET ORAL
Qty: 1 | Refills: 0 | Status: ACTIVE | COMMUNITY
Start: 2025-02-11 | End: 1900-01-01

## 2025-03-11 ENCOUNTER — APPOINTMENT (OUTPATIENT)
Dept: ORTHOPEDIC SURGERY | Facility: CLINIC | Age: 80
End: 2025-03-11

## 2025-08-19 ENCOUNTER — NON-APPOINTMENT (OUTPATIENT)
Age: 80
End: 2025-08-19